# Patient Record
Sex: FEMALE | Race: OTHER | HISPANIC OR LATINO | Employment: OTHER | URBAN - METROPOLITAN AREA
[De-identification: names, ages, dates, MRNs, and addresses within clinical notes are randomized per-mention and may not be internally consistent; named-entity substitution may affect disease eponyms.]

---

## 2017-01-18 ENCOUNTER — GENERIC CONVERSION - ENCOUNTER (OUTPATIENT)
Dept: OTHER | Facility: OTHER | Age: 67
End: 2017-01-18

## 2017-03-08 ENCOUNTER — GENERIC CONVERSION - ENCOUNTER (OUTPATIENT)
Dept: OTHER | Facility: OTHER | Age: 67
End: 2017-03-08

## 2017-05-19 ENCOUNTER — GENERIC CONVERSION - ENCOUNTER (OUTPATIENT)
Dept: OTHER | Facility: OTHER | Age: 67
End: 2017-05-19

## 2017-06-19 ENCOUNTER — GENERIC CONVERSION - ENCOUNTER (OUTPATIENT)
Dept: OTHER | Facility: OTHER | Age: 67
End: 2017-06-19

## 2018-01-12 NOTE — PROGRESS NOTES
Edited Patient Health Assessment    Date:            03/08/2017  Blood Pressure:  136/80  Pulse:           70  Age:             74  Weight:          138 lbs  Height/Length:   5' 1"  Body Mass Index: 26 1  Provider:        [email or web address]  Clinic:          [facility] exam and adult prophy  consulted Dr Rohit Rizo re: previous notes re: premed for a catherization of  heart that was done years ago     per our conversation and previous dr note on 1/18/17, no premed indicated  Medical Alert: Anemia    Artificial Joints    Asthma    Glaucoma    Heart Condition    Rheumatic Fever    Frequent Headaches    Low Blood Pressure    Depression/Anxiety    cholesterol  Medications: Unknow ( Patient to bring Medication list)    Lithium    OLANZAPINE    Meloxicam    ASPIRin EC 81 MG TABLET 81 MG    Famotidine    Fluticasone Propionate    METOPROLOL   TAB 25MG ER 25    VIT D2 1 25 MG (50,000 UNIT)    OTHER  Allergies:      none  Since Last Visit: Medical Alert: No Change    Medications: No Change    Allergies:        No Change  Pain Scale Type: Numeric Pain ScalePain Level: 0  Description: pt reports some pain on UR occasionally  tried cavitron/ unable to use due to sensitivity  mainly polished off heavy  plaque accumulations today  cleaned denture in ultrasonic also  poor oral hygiene   heavy debris present generalized  Dr Rohit Rizo exam= numerous areas of decay      ----- Signed on Wednesday, March 08, 2017 at 2:29:50 PM  -----  ----- Provider: [id number] - Maral Grey RD -- Clinic: [address] [place] [address] -----

## 2018-01-13 NOTE — PROGRESS NOTES
Pt presents to schedule apt  I have not used a pre med for my care in the  past   So, I think there is no reason for the pre med now    nv  prophy and re  evaluate the tx plan     ----- Signed on Wednesday, January 18, 2017 at 1:31:39 PM  -----  ----- Provider: [id number] Britton DMD -- Clinic: [place] -----

## 2018-01-17 NOTE — PROGRESS NOTES
Patient Health Assessment    Date:            05/19/2017  Blood Pressure:  125/69  Pulse:           68  Age:             67  Weight:          0 lbs  Height/Length:   0' 0"  Body Mass Index: 0 0  Provider:        QUINTON  Clinic:          [facility]        Medical Alert: Anemia    Artificial Joints    Asthma    Glaucoma    Heart Condition    Rheumatic Fever    Frequent Headaches    Low Blood Pressure    Depression/Anxiety    cholesterol  Medications: Unknow ( Patient to bring Medication list)    Lithium    OLANZAPINE    Meloxicam    ASPIR EC 81 MG TABLET 81 MG    Famotidine    Fluticasone Propionate    METOPROLOL   TAB 25MG ER 25    VIT D2 1 25 MG (50,000 UNIT)    OTHER  Allergies:  Since Last Visit: Medical Alert: No Change    Medications: No Change    Allergies:        No Change  Pain Scale Type: Numeric Pain ScalePain Level: 0  Description: 74 yrs old pt presented for restoration  Decided to only worrk  on the right side today  Administered 1 carpule of 4% Septocaine with 1:100k  epi as infiltration around teeth #25 and 26  Prepared tooth #25 D and #26 M  Etched, bonded and restored with beautifil-flowable and bulk alpha II  composite  Finished and polished both restorations  checked the contacts    n v resin #23 and 24  a z/dr Smith Moura    ----- Signed on Friday, May 19, 2017 at 12:21:17 PM  -----  ----- Provider: [email or web address] - Resident Three, Dentist -- Clinic: [facility]  -----

## 2018-01-18 NOTE — PROGRESS NOTES
Patient Health Assessment    Date:            06/19/2017  Blood Pressure:  127/72  Pulse:           66  Age:             67  Weight:          138 lbs  Height/Length:   5' 1"  Body Mass Index: 26 1  Provider:        [email or web address]  Clinic:          [facility]        Medical Alert: Anemia    Artificial Joints    Asthma    Glaucoma    Heart Condition    Rheumatic Fever    Frequent Headaches    Low Blood Pressure    Depression/Anxiety    cholesterol  Medications: Unknow ( Patient to bring Medication list)    Lithium    OLANZAPINE    Meloxicam    ASPIR EC 81 MG TABLET 81 MG    Famotidine    Fluticasone Propionate    METOPROLOL   TAB 25MG ER 25    VIT D2 1 25 MG (50,000 UNIT)    OTHER  Allergies:  Since Last Visit: Medical Alert: No Change    Medications: No Change    Allergies:        No Change  Pain Scale Type: Numeric Pain ScalePain Level: 0  Description:      NOTE::: History of hip replacement  When asked, Boyfriend disclosed that pt  used the take antibiotics in the past before her dental visits and in the  past was told by physician that she needs antibiotics before going to the  dentist  Pt had run out of the antibiotics long time ago they said  Gave Dental   clearance form to take to their doctor and explained that we would need the  clearance before doing any work  Pt agreed  S: Pt presented with boyfriend with pain on and off from #12  Pt pointing at  #12  Pt said pain only when chewing and biting down  No spontaneous pain,  doesn't wake pt up at night  Pain only when chewing  Boyfriend helped in  translation  O: No swelling present  No signs of infection  Took PA and no clear pathology  noted on xray  Tooth has and existing root canal and build up  Clinically no  pathology noted either  Heavy occlusion on #12  #12 percussion (+)  #12 palpation (-)    A: Differential diag:  - Occlusal trauma  -Possible root fracture    P: Reduced occlusion of #12 and took tooth off the occlusion   Explained that if   tooth is still hurting after a couple of days that a fracture might exist  and tooth will need to be further evaluated for extraction  Pt and her  boyfriend understood and agreed  Pt left with her boyfriend in good health  Advised to call us if pain gets worse or if it doesn't go away  NV: resins (check on clearance)      SINAN Jones    ----- Signed on Monday, June 19, 2017 at 12:26:26 PM  -----  ----- Provider: Fabian Huerta Dentist -- Clinic: [address] [place] [address] -----

## 2020-09-04 ENCOUNTER — TELEMEDICINE (OUTPATIENT)
Dept: FAMILY MEDICINE CLINIC | Facility: CLINIC | Age: 70
End: 2020-09-04
Payer: COMMERCIAL

## 2020-09-04 DIAGNOSIS — M06.9 RHEUMATOID ARTHRITIS INVOLVING BOTH HANDS, UNSPECIFIED RHEUMATOID FACTOR PRESENCE: ICD-10-CM

## 2020-09-04 DIAGNOSIS — F99 PSYCHIATRIC DISORDER: Primary | ICD-10-CM

## 2020-09-04 DIAGNOSIS — I10 HYPERTENSION, UNSPECIFIED TYPE: ICD-10-CM

## 2020-09-04 DIAGNOSIS — R32 URINARY INCONTINENCE, UNSPECIFIED TYPE: ICD-10-CM

## 2020-09-04 DIAGNOSIS — R41.89 COGNITIVE DECLINE: ICD-10-CM

## 2020-09-04 PROCEDURE — 3288F FALL RISK ASSESSMENT DOCD: CPT | Performed by: FAMILY MEDICINE

## 2020-09-04 PROCEDURE — 1101F PT FALLS ASSESS-DOCD LE1/YR: CPT | Performed by: FAMILY MEDICINE

## 2020-09-04 PROCEDURE — 3725F SCREEN DEPRESSION PERFORMED: CPT | Performed by: FAMILY MEDICINE

## 2020-09-04 PROCEDURE — 99203 OFFICE O/P NEW LOW 30 MIN: CPT | Performed by: FAMILY MEDICINE

## 2020-09-04 NOTE — PROGRESS NOTES
Virtual Regular Visit      Assessment/Plan:    Problem List Items Addressed This Visit        Cardiovascular and Mediastinum    Hypertension     Per son, patient previously on 5 medications 1 which were metoprolol (unsure of other 4 medications)  Unknown whether or not blood pressure is within target but I suspect it is not as she has not been on her medications  Will ask how long she has been off of her medications  Will follow-up blood pressure measurement at 1 week follow-up, will assess need for antihypertensive at that time  Musculoskeletal and Integument    Rheumatoid arthritis ([address] )     Extent not determined but visibly deformed hands and fingers bilaterally, Ulnar deviation of fingers  Previously on prednisone which was discontinued by son for concerns of agitation/overactivity and increasing risk of hurting herself  Will consider referral to Rheumatology at follow-up visit in 1 week to assess patient in person  At this 1 week follow-up well determine extent of rheumatoid arthritis on examination  Other    Psychiatric disorder - Primary     Son reports history of bipolar disorder and schizophrenia  Previously on lithium for bipolar disorder and olanzapine for schizophrenia which were both discontinued by son for concerns of urinary incontinence which has not improved since cessation of medications  Follow-up 1 week in order to assess patient in clinic  Cognitive decline     Per son: progressive cognitive decline  diagnosed with acute dementia in 2017  Reports not oriented and needs frequent reorientation  Thanks of her children as they were young  Considering complex history will refer to Geriatrics  Discuss this with son and will revisit at follow-up in1 week  Urinary incontinence     Not on any medications per son  Uses incontinence supplies which he gets for her from a company named [employer] [place]  Son would like us to obtain supplies for patient from this company  He will bring company information at follow-up in 1 week  Reason for visit is No chief complaint on file  Encounter provider Reza Fox MD    Provider located at [address] [place] [address]  [phone]      Recent Visits  Date Type Provider Dept   20 Telemedicine Chema Hidalgo MD [facility] recent visits within past 7 days and meeting all other requirements     Future Appointments  No visits were found meeting these conditions  Showing future appointments within next 150 days and meeting all other requirements        The patient was identified by name and date of birth  Darin Ramos was informed that this is a telemedicine visit and that the visit is being conducted through [facility] and patient was informed that this is a secure, HIPAA-compliant platform  She agrees to proceed     My office door was closed  No one else was in the room  She acknowledged consent and understanding of privacy and security of the video platform  The patient has agreed to participate and understands they can discontinue the visit at any time  Patient is aware this is a billable service  Subjective  Darin Ramos is a 78 y o  female to establish care and discuss decline in cognitive function and urinary incontinence  History as per son who is sitting beside the patient, patient was intermittently visible throughout the encounter  Was been cared for by care taker who was friend of family but son feels like "things were being overlooked and I stepped in " He started caring for her in  after her mom , before that she lived with her mom in [place] due to her "mental illness as she "tried to kill herself " After this she lived with other son   In  "she found someone who was like a family friend who could take care of her " This per son cared for her until 2019 when son saw that she was being neglected evidence by a large sacral pressure ulcer  Ultimately the pts son got POA from  and was given a waiver  Son states that "the state started to send doctors and nurses to the house to take care of her " He also states that "she is really sick and has a lot of health conditions and her condition has been declining "    Psych: Bipolar previously treated with lithium, schizophrenia rx'd with olanzapine in past  Treated for bipolar disorder since 2000  Son felt that pts antipsychotics were causing her urinary incontinence as he read that and began to wean her off lithium and olanzapine but states that she continues to have dealing urinary incontinence  She is currently not on any psychiatric medications per son  Neuro:   · Seen at [facility] and diagnosed with "acute dementia," in 2017  · Confused, has not be oriented to place or time  Thinks of her children as "youngsters "  · Was put into "3-4 facilities but she got herself out " He thinks they were nursing homes  The previous caretaker put her in each facility  Son took her out of the facility and moved her into his home as he was concerned that they were not taking good care of her as she had told him "people come into my room and steal things "     [facility]:   · HTN: was on metoprolol, "and 3 other BP medications " Son took her off all medications because "she was on 5 heart meds and I think they were just making sxs up  I got her off all of her BP meds " Currently not on any BP meds  Unsure of her recent Blood pressures  MSK:   · H/o RA, severe  Is wheelchair bound  Was on prednisone "but was hurting herself more because she would over exert herself because she couldn't feel pain so she would do too much and was overactive " Son gives her ibuprofen when she complains of pain, and son states ibuprofen is the ONLY medication pt is currently on as he had weaned her off of all of her other medications  :   · Urinary incontinence  Currently getting supplies from "[employer]" and is wondering if we can order it from them if he gives us their number  Has "home aid from the waiver program that cares for her during the day " Son cares for pt at night  Would like TIFFANIE  Has difficulty with transportation as her family doesn't have a vehicle that is wheel chair compatible but also is a lot of work to get her into the car  Needs assistance with all ADLs and iADLs  Past Medical History:   Diagnosis Date    Anxiety     Depression     High cholesterol     Hypertension     Psychiatric disorder     Rheumatoid [address] [address] )        Past Surgical History:   Procedure Laterality Date    CHOLECYSTECTOMY      COLONOSCOPY      EYE SURGERY Bilateral 2012    HIP SURGERY Left 2010    HYSTERECTOMY      LINUS and ?BSO    MAMMO (HISTORICAL)  03/2017   - hip surgery in 2009    No current outpatient medications on file  No current facility-administered medications for this visit  No Known Allergies    Review of Systems   Constitutional:        Wheelchair-bound   Respiratory: Negative for cough  Genitourinary:        Urinary incontinence   Musculoskeletal: Positive for arthralgias  Neurological: Positive for weakness  Confusion  Memory loss  Poor short-term memory, requires frequent orientation as to date, place  Also needs reminder that kids are adults and not children  Psychiatric/Behavioral: Positive for confusion  Video Exam    There were no vitals filed for this visit  Physical Exam  Constitutional:       General: She is not in acute distress  Appearance: She is not diaphoretic  Comments: Patient lying in bed, son seated bedside  Patient smiling when phone turned towards her  Pulmonary:      Effort: Pulmonary effort is normal    Musculoskeletal:         General: Deformity (Gross deformity of bilateral MCP joints, ulnar deviation noted bilaterally ) present            I spent 10 minutes directly with the patient during this visit, but as patient has cognitive dysfunction majority of time spent with sun while patient was beside him  Total time spent during this encounter is 50 minutes  VIRTUAL VISIT DISCLAIMER    Darin Ramos acknowledges that she has consented to an online visit or consultation  She understands that the online visit is based solely on information provided by her, and that, in the absence of a face-to-face physical evaluation by the physician, the diagnosis she receives is both limited and provisional in terms of accuracy and completeness  This is not intended to replace a full medical face-to-face evaluation by the physician  Darin Ramos understands and accepts these terms

## 2020-09-05 PROBLEM — R32 URINARY INCONTINENCE: Status: ACTIVE | Noted: 2020-09-05

## 2020-09-05 PROBLEM — R41.89 COGNITIVE DECLINE: Status: ACTIVE | Noted: 2020-09-05

## 2020-09-05 PROBLEM — E78.49 OTHER HYPERLIPIDEMIA: Status: ACTIVE | Noted: 2020-09-05

## 2020-09-05 PROBLEM — I25.119 CORONARY ARTERY DISEASE INVOLVING NATIVE CORONARY ARTERY OF NATIVE HEART WITH ANGINA PECTORIS (HCC): Status: ACTIVE | Noted: 2020-09-05

## 2020-09-06 NOTE — ASSESSMENT & PLAN NOTE
Not on any medications per son  Uses incontinence supplies which he gets for her from a company named [employer]  Son would like us to obtain supplies for patient from this company  He will bring company information at follow-up in 1 week

## 2020-09-06 NOTE — ASSESSMENT & PLAN NOTE
Per son, patient previously on 5 medications 1 which were metoprolol (unsure of other 4 medications)  Unknown whether or not blood pressure is within target but I suspect it is not as she has not been on her medications  Will ask how long she has been off of her medications  Will follow-up blood pressure measurement at 1 week follow-up, will assess need for antihypertensive at that time

## 2020-09-06 NOTE — ASSESSMENT & PLAN NOTE
Per son: progressive cognitive decline  diagnosed with acute dementia in 2017  Reports not oriented and needs frequent reorientation  Thanks of her children as they were young  Considering complex history will refer to Geriatrics  Discuss this with son and will revisit at follow-up in1 week

## 2020-09-06 NOTE — ASSESSMENT & PLAN NOTE
Son reports history of bipolar disorder and schizophrenia  Previously on lithium for bipolar disorder and olanzapine for schizophrenia which were both discontinued by son for concerns of urinary incontinence which has not improved since cessation of medications  Follow-up 1 week in order to assess patient in clinic

## 2020-09-06 NOTE — ASSESSMENT & PLAN NOTE
Noted on care everywhere:  (2017)   · Total cholesterol 215  ·   · Triglycerides 183    Again, not on any medications per son

## 2020-09-06 NOTE — ASSESSMENT & PLAN NOTE
Extent not determined but visibly deformed hands and fingers bilaterally, Ulnar deviation of fingers  Previously on prednisone which was discontinued by son for concerns of agitation/overactivity and increasing risk of hurting herself  Will consider referral to Rheumatology at follow-up visit in 1 week to assess patient in person  At this 1 week follow-up well determine extent of rheumatoid arthritis on examination

## 2020-09-11 ENCOUNTER — OFFICE VISIT (OUTPATIENT)
Dept: FAMILY MEDICINE CLINIC | Facility: CLINIC | Age: 70
End: 2020-09-11
Payer: COMMERCIAL

## 2020-09-11 VITALS
TEMPERATURE: 96.2 F | OXYGEN SATURATION: 99 % | DIASTOLIC BLOOD PRESSURE: 60 MMHG | HEART RATE: 100 BPM | RESPIRATION RATE: 20 BRPM | WEIGHT: 70 LBS | SYSTOLIC BLOOD PRESSURE: 86 MMHG

## 2020-09-11 DIAGNOSIS — I25.119 CORONARY ARTERY DISEASE INVOLVING NATIVE CORONARY ARTERY OF NATIVE HEART WITH ANGINA PECTORIS (HCC): ICD-10-CM

## 2020-09-11 DIAGNOSIS — R41.3 MEMORY DISTURBANCE: ICD-10-CM

## 2020-09-11 DIAGNOSIS — R32 URINARY INCONTINENCE, UNSPECIFIED TYPE: ICD-10-CM

## 2020-09-11 DIAGNOSIS — E78.49 OTHER HYPERLIPIDEMIA: ICD-10-CM

## 2020-09-11 DIAGNOSIS — M06.9 RHEUMATOID ARTHRITIS INVOLVING BOTH HANDS, UNSPECIFIED RHEUMATOID FACTOR PRESENCE: ICD-10-CM

## 2020-09-11 DIAGNOSIS — R41.89 COGNITIVE DECLINE: Primary | ICD-10-CM

## 2020-09-11 DIAGNOSIS — R64 CACHECTIC (HCC): ICD-10-CM

## 2020-09-11 DIAGNOSIS — I25.10 CORONARY ARTERY DISEASE INVOLVING NATIVE CORONARY ARTERY OF NATIVE HEART, ANGINA PRESENCE UNSPECIFIED: ICD-10-CM

## 2020-09-11 DIAGNOSIS — F99 PSYCHIATRIC DISORDER: ICD-10-CM

## 2020-09-11 DIAGNOSIS — Z13.1 SCREENING FOR DIABETES MELLITUS: ICD-10-CM

## 2020-09-11 DIAGNOSIS — I95.89 OTHER SPECIFIED HYPOTENSION: ICD-10-CM

## 2020-09-11 DIAGNOSIS — Z78.9 NEED FOR FOLLOW-UP BY SOCIAL WORKER: ICD-10-CM

## 2020-09-11 DIAGNOSIS — R53.83 FATIGUE, UNSPECIFIED TYPE: ICD-10-CM

## 2020-09-11 DIAGNOSIS — R68.89 LOW BODY TEMPERATURE: ICD-10-CM

## 2020-09-11 PROCEDURE — 99214 OFFICE O/P EST MOD 30 MIN: CPT | Performed by: FAMILY MEDICINE

## 2020-09-11 NOTE — PROGRESS NOTES
Family Medicine Follow-Up Office Visit  Kash Smallwood 78 y o  female   MRN: [id number] : 1950  ENCOUNTER: 2020 10:38 AM    Assessment & Plan   Low blood pressure  Likely 2/2 severely malnourished and minimal po intake  Has had chronic intermittent dizziness but no change as of now  Son unsure of recent BP as she used to be on anti-HTN meds  Plan: encouraged po intake of food and liquids  ED precautions emphasized, caretaker verbalizes understanding and is agreeable  F/u 2 weeks, reassess  Low body temperature  Likely 2/2 minimal adiposity  Visibly appears cold, reports she is always cold  Plan: CBC ordered, f/u wbc count  Encouraged po intake in attempt to increase adiposity but will likely need nutrition supplementation, may be best to get nutritionist involved but considering multiple chronic illnesses that are poorly controlled due to non-adherence, will first have them f/u with geriatrics and psych to assess if underlying etiology is psychiatric vs dementia and to optimize adherence to plan will avoid overwhelming with too many f/u all at once  F/u in 2 weeks, reassess temp and ensure they've scheduled f/u with specialists  Other hyperlipidemia  H/o hyperlipidemia and CAD s/p stent  Unknown if on statin but considering her cardiac hx she should be  Plan: ordered lipid panel, will discuss at 2 week f/u and assess ASCVD risk and weight benefit to risk considering poor status 2/2 multiple poorly controlled chronic diseases and what goals of care are  F/u in 2 weeks, discuss labs, ASCVD risk score and goals of care  Urinary incontinence  Unspecified type  Has incontinence supplies currently to last approx 1 month per son  Would like us to work with the company they are already getting incontinence supplies from  Plan: will ask for company info at f/u visit and have staff reach out to company and get this process started       Cognitive decline  2/2 dementia vs psychosis in setting of uncontrolled psychiatric conditions (bipolar 1 disorder and schizophrenia), may by ?schizoaffective disorder considering sxs  Plan: referral to geriatrics for mindstream testing, referral to psychiatry for assessment of psychiatric conditions educated pt that sxs that are consistent with dementia can overlap with psychosis and discussed importance of psych and geriatrics f/u  Caregiver verbalizes understanding and agrees with the plan  Psychiatric disorder  H/o bipolar 1 disorder and schizophrenia  Schizoaffective disorder is also on my differential  Was tearful with sad affect during this encounter, son reports this is how she is at home  Not taking meds, was previously on olanzapine + lithium but stopped by son for concerns that it was making her agitated, she would scream  Plan: referral to psychiatry for assessment of psychiatric conditions, f/u in 2 weeks to ensure they've scheduled appointment  Need for follow-up by   There was mention of difficulty with transportation as pt is wheelchair bound  Also, questionable adherence to treatment by caregiver, may benefit from [facility] assessment  Plan: social work referral sent, appreciated input  Rheumatoid arthritis ([address] )  Was previously on prednisone (per son) and leflunamide (per chart review - care everywhere)  Both were stopped by son because of concerns that the decreased pain makes increases activity which leads to injuries  Severe deformity of hands and feet bilaterally  Plan: will discuss referral to rheumatology at f/u, did not do it today as want to avoid overwhelming caregiver in attempt to optimize adherence to plan  Also, need guidance by social work for transport, social work referral ordered  F/u in 2 weeks  Coronary artery disease involving native coronary artery of native heart with angina pectoris [facility])  S/p angioplasty with stent (2002)  Care everywhere (8/2017) reports h/o CAD, hyperlipidemia, and hypertension  Son reports was on metoprolol but he "weaned her off," though metoprolol not mentioned in last note by cardiology [facility]) in 8/2017  Today has low BP  Plan: ordered lipid panel, f/u bp at next visit  I suspect low pressures due to cachexia with poor po intake   ED precautions emphasized  Caregiver verbalizes understanding and agrees with the plan  All orders for this visit as below:  Orders Placed This Encounter   Procedures    CBC and Platelet    Comprehensive metabolic panel    TSH, 3rd generation    Vitamin B12    Lipid panel    Folate    Ambulatory referral to Psychiatry    Ambulatory referral to Geriatrics    Ambulatory referral to social work care management program     Discussed case with Dr Dereje Green examined pt as well  Subjective     Chief Complaint   F/u for cognitive decline, urinary incontinence, psych history, HTN  History of Present Illness   Darshana Galaviz is a 78y o -year-old female who presents today for f/u of virtual visit to further discuss Hazel's medical conditions  Son is POA/caregiver and is present  HPI from pt, son, and home health aid       Neuro: cognitive decline and memory loss  Needs frequent reorientation for time and place per son but today pt able to verbalize that  Son believes she has "advanced stage alzheimer's" based upon his research online       Psych: h/o bipolar 1 disorder and schizophrenia, not on medications as son weaned her off of them because he was concerned that they were causing her urinary incontinence  Reports that pt has decreased appetite, weight loss, and at times becomes tearful  Also appears "tired all the time " Son admits that pt becomes tearful often       [facility]: h/o HTN was on "5 medications" but son felt that she did not need them and that the doctors/nurses provided by the state were "making up symptoms to give her more medications " Also has h/o CAD s/p stent noted on chart review (care everywhere)  H/o hyperlipidemia     [facility]: severe RA, wheelchair bound  Again, meds (prednisone, leflunamide) stopped by caregiver as "she gets agitated and starts screaming  It stops her from feeling pain and so she gets active and hurts her self "      : Urinary incontinence  Gets supplies from a company called [employer]  Would like us to work with them  Past Medical History:   Diagnosis Date    Anxiety     Depression     High cholesterol     Hypertension     Psychiatric disorder     Rheumatoid arthritis ([address] )        Prior Medications for active conditions but not being taken: (per care everywhere - last visit with cardiology 8/14/2017)  · Famotidine 40 mg qd  · flovent 50mcg 1 puff bid   · leflunomide 20mg qd   · Lithium 300mg tid   · Losartan 25mg qd  · meloxicam 7 5mg qd   · Olanzapine (zyprexa) 10mg qhs  FamHx/SocHx  family history includes Colon cancer in her father; Glaucoma in her mother; Heart attack in her mother; Hypertension in her father and mother; Thyroid disease in her father    reports that she has quit smoking  She quit after 30 00 years of use  She does not have any smokeless tobacco history on file  She reports current alcohol use  (1-2 glasses of wine per night)      Review of Systems   Review of Systems   Constitutional: Positive for activity change (wheelchair bound due to severe RA), appetite change and fatigue  Respiratory: Negative for shortness of breath  Cardiovascular: Negative for chest pain and leg swelling  Gastrointestinal: Negative for abdominal pain, blood in stool and diarrhea  Genitourinary:        Urinary incontinence    Musculoskeletal: Positive for arthralgias (B/L feet and hands  deformities of hands and feet 2/2 RA)  Wheelchair bound due to severe RA   Skin: Positive for pallor  Neurological: Positive for dizziness (intermittent)     Psychiatric/Behavioral:        Appears "down", son believed due to "severe dementia "        Active Problem List     Patient Active Problem List   Diagnosis    Psychiatric disorder    [facility]    Low blood pressure    Cognitive decline    Urinary incontinence    Coronary artery disease involving native coronary artery of native heart with angina pectoris ([facility] )    Other hyperlipidemia    Low body temperature    Need for follow-up by        Past Medical History, Past Surgical History, Family History, and Social History were reviewed and updated today as appropriate  Objective   BP (!) 86/60 (BP Location: Right arm, Patient Position: Sitting, Cuff Size: Child)   Pulse 100   Temp (!) 96 2 °F (35 7 °C)   Resp 20   Wt 31 8 kg (70 lb)   SpO2 99%     Physical Exam  Constitutional:       General: She is not in acute distress  Appearance: She is ill-appearing  She is not toxic-appearing  Comments: Severely cachectic   HENT:      Head:      Comments: Temporal wasting     Mouth/Throat:      Comments: Edentulous   Eyes:      General: No scleral icterus  Right eye: No discharge  Left eye: No discharge  Pupils: Pupils are equal, round, and reactive to light  Cardiovascular:      Rate and Rhythm: Normal rate and regular rhythm  Pulses: Normal pulses  Heart sounds: Normal heart sounds  No murmur  Pulmonary:      Effort: Pulmonary effort is normal       Breath sounds: Normal breath sounds  No wheezing or rales  Abdominal:      General: Abdomen is flat  Bowel sounds are normal  There is no distension  Palpations: Abdomen is soft  Tenderness: There is no abdominal tenderness  There is no guarding  Musculoskeletal:         General: Deformity (of hands and feet  ) present  Right lower leg: No edema  Left lower leg: No edema  Comments: Prominent clavicles, ribs  Overall, has minimal adiposity  Skin:     Capillary Refill: Capillary refill takes 2 to 3 seconds  Coloration: Skin is pale  Skin is not jaundiced  Findings: No bruising     Neurological: Comments: Oriented to person and place but not to time/season   Psychiatric:      Comments: Sad affect, was tearful during encounter  Pertinent Laboratory/Diagnostic Studies:  No labs at this time  Orders Placed This Encounter   Procedures    CBC and Platelet    Comprehensive metabolic panel    TSH, 3rd generation    Vitamin B12    Lipid panel    Folate    Ambulatory referral to Psychiatry    Ambulatory referral to Geriatrics    Ambulatory referral to social work care management program       Current Medications   Ibuprofen prn (per son) - previous meds as above (see HPI)    ALLERGIES:  No Known Allergies    Health Maintenance     Health Maintenance   Topic Date Due    Hepatitis C Screening  1950    Medicare Annual Wellness Visit (AWV)  1950    MAMMOGRAM  1950    BMI: Adult  02/04/1970    DTaP,Tdap,and Td Vaccines (1 - Tdap) 02/04/1973    Colorectal Cancer Screening  02/04/2002    Pneumococcal Vaccine: 65+ Years (1 of 1 - PPSV23) 02/04/2017    Influenza Vaccine  07/01/2020    Fall Risk  09/03/2021    Depression Screening PHQ  09/04/2021    HIB Vaccine  Aged Out    Hepatitis B Vaccine  Aged Out    IPV Vaccine  Aged Out    Hepatitis A Vaccine  Aged Out    Meningococcal ACWY Vaccine  Aged Out    HPV Vaccine  Aged Out       There is no immunization history on file for this patient  Roshan Morgan MD   [address]  9/13/2020  10:38 AM    Parts of this note were dictated using Clean[employer] dictation software and may have sounds-like errors due to variation in pronunciation

## 2020-09-13 PROBLEM — R68.89 LOW BODY TEMPERATURE: Status: ACTIVE | Noted: 2020-09-13

## 2020-09-13 PROBLEM — Z78.9 NEED FOR FOLLOW-UP BY SOCIAL WORKER: Status: ACTIVE | Noted: 2020-09-13

## 2020-09-13 NOTE — ASSESSMENT & PLAN NOTE
H/o hyperlipidemia and CAD s/p stent  Unknown if on statin but considering her cardiac hx she should be  Plan: ordered lipid panel, will discuss at 2 week f/u and assess ASCVD risk and weight benefit to risk considering poor status 2/2 multiple poorly controlled chronic diseases and what goals of care are  F/u in 2 weeks, discuss labs, ASCVD risk score and goals of care

## 2020-09-13 NOTE — ASSESSMENT & PLAN NOTE
Likely 2/2 severely malnourished and minimal po intake  Has had chronic intermittent dizziness but no change as of now  Son unsure of recent BP as she used to be on anti-HTN meds  Plan: encouraged po intake of food and liquids  ED precautions emphasized, caretaker verbalizes understanding and is agreeable  F/u 2 weeks, reassess

## 2020-09-13 NOTE — ASSESSMENT & PLAN NOTE
H/o bipolar 1 disorder and schizophrenia  Schizoaffective disorder is also on my differential  Was tearful with sad affect during this encounter, son reports this is how she is at home  Not taking meds, was previously on olanzapine + lithium but stopped by son for concerns that it was making her agitated, she would scream  Plan: referral to psychiatry for assessment of psychiatric conditions, f/u in 2 weeks to ensure they've scheduled appointment

## 2020-09-13 NOTE — ASSESSMENT & PLAN NOTE
Likely 2/2 minimal adiposity  Visibly appears cold, reports she is always cold  Plan: CBC ordered, f/u wbc count  Encouraged po intake in attempt to increase adiposity but will likely need nutrition supplementation, may be best to get nutritionist involved but considering multiple chronic illnesses that are poorly controlled due to non-adherence, will first have them f/u with geriatrics and psych to assess if underlying etiology is psychiatric vs dementia and to optimize adherence to plan will avoid overwhelming with too many f/u all at once  F/u in 2 weeks, reassess temp and ensure they've scheduled f/u with specialists

## 2020-09-13 NOTE — ASSESSMENT & PLAN NOTE
S/p angioplasty with stent (2002)  Care everywhere (8/2017) reports h/o CAD, hyperlipidemia, and hypertension  Son reports was on metoprolol but he "weaned her off," though metoprolol not mentioned in last note by cardiology [facility]) in 8/2017  Today has low BP  Plan: ordered lipid panel, f/u bp at next visit  I suspect low pressures due to cachexia with poor po intake   ED precautions emphasized  Caregiver verbalizes understanding and agrees with the plan

## 2020-09-13 NOTE — ASSESSMENT & PLAN NOTE
Unspecified type  Has incontinence supplies currently to last approx 1 month per son  Would like us to work with the company they are already getting incontinence supplies from  Plan: will ask for company info at f/u visit and have staff reach out to company and get this process started

## 2020-09-13 NOTE — ASSESSMENT & PLAN NOTE
There was mention of difficulty with transportation as pt is wheelchair bound  Also, questionable adherence to treatment by caregiver, may benefit from [facility] On Aging assessment  Plan: social work referral sent, appreciated input

## 2020-09-14 ENCOUNTER — TELEPHONE (OUTPATIENT)
Dept: GERIATRICS | Age: 70
End: 2020-09-14

## 2020-09-14 NOTE — TELEPHONE ENCOUNTER
[address]  [phone]  Telephone Intake Geriatric Assessment     Referral Source: Fuentes Mace MD                             Patients PCP: Dr Angelika Webb): son      Cindi Larkiney): son                      Phone Number: [phone]              Is caller POA? Yes      Reason for referral: Memory issues      Others residing with patient: N/A     Has the patient ever been formally tested for memory/dementia? unknown     Has the patient ever been diagnosed with dementia? unknown     Has the patient been seen by a Neurologist?  unknown     What is the goal of visit? Needs guidances regarding care for mom     Preferred language? English    Highest education level? unknown    Can patient read English? can't read or comprehend    Does the patient wear glasses? unknown    Does the patient use hearing aids? unknown     First Visit: TBD    Conference Visit: TBD  Patient's son stated that patient's hands is extremely contracted, patient is not aware of her surroundings, and she is incontinent of urine and bowel  In addition he has to carry patient to and from bathroom  Patient's son feels like he is at a lose and does not know what to do       Office packet mailed out: No    In office visit and family conference video visit process explained: No

## 2020-09-25 ENCOUNTER — OFFICE VISIT (OUTPATIENT)
Dept: FAMILY MEDICINE CLINIC | Facility: CLINIC | Age: 70
End: 2020-09-25
Payer: COMMERCIAL

## 2020-09-25 ENCOUNTER — PATIENT OUTREACH (OUTPATIENT)
Dept: CASE MANAGEMENT | Facility: OTHER | Age: 70
End: 2020-09-25

## 2020-09-25 VITALS
OXYGEN SATURATION: 100 % | WEIGHT: 70 LBS | DIASTOLIC BLOOD PRESSURE: 70 MMHG | SYSTOLIC BLOOD PRESSURE: 100 MMHG | TEMPERATURE: 97.7 F | HEART RATE: 90 BPM

## 2020-09-25 DIAGNOSIS — R64 CACHECTIC (HCC): ICD-10-CM

## 2020-09-25 DIAGNOSIS — F99 PSYCHIATRIC DISORDER: Primary | ICD-10-CM

## 2020-09-25 DIAGNOSIS — M06.9 RHEUMATOID ARTHRITIS INVOLVING BOTH HANDS, UNSPECIFIED RHEUMATOID FACTOR PRESENCE: ICD-10-CM

## 2020-09-25 DIAGNOSIS — I95.89 OTHER SPECIFIED HYPOTENSION: ICD-10-CM

## 2020-09-25 DIAGNOSIS — Z78.9 NEED FOR FOLLOW-UP BY SOCIAL WORKER: ICD-10-CM

## 2020-09-25 DIAGNOSIS — R32 URINARY INCONTINENCE, UNSPECIFIED TYPE: ICD-10-CM

## 2020-09-25 DIAGNOSIS — Z78.9 NEED FOR FOLLOW-UP BY SOCIAL WORKER: Primary | ICD-10-CM

## 2020-09-25 DIAGNOSIS — E46 MALNUTRITION, UNSPECIFIED TYPE (HCC): ICD-10-CM

## 2020-09-25 DIAGNOSIS — R41.89 COGNITIVE DECLINE: ICD-10-CM

## 2020-09-25 PROCEDURE — 1036F TOBACCO NON-USER: CPT | Performed by: FAMILY MEDICINE

## 2020-09-25 PROCEDURE — 3074F SYST BP LT 130 MM HG: CPT | Performed by: FAMILY MEDICINE

## 2020-09-25 PROCEDURE — 99213 OFFICE O/P EST LOW 20 MIN: CPT | Performed by: FAMILY MEDICINE

## 2020-09-25 PROCEDURE — 1160F RVW MEDS BY RX/DR IN RCRD: CPT | Performed by: FAMILY MEDICINE

## 2020-09-25 PROCEDURE — 3078F DIAST BP <80 MM HG: CPT | Performed by: FAMILY MEDICINE

## 2020-09-25 RX ORDER — IBUPROFEN 400 MG/1
TABLET ORAL EVERY 6 HOURS PRN
COMMUNITY
End: 2022-06-22

## 2020-09-25 RX ORDER — LACTOSE-REDUCED FOOD
1 LIQUID (ML) ORAL 3 TIMES DAILY
Qty: 90 BOTTLE | Refills: 2 | Status: SHIPPED | OUTPATIENT
Start: 2020-09-25 | End: 2020-10-02

## 2020-09-25 RX ORDER — LACTOSE-REDUCED FOOD
1 LIQUID (ML) ORAL 3 TIMES DAILY
Qty: 90 BOTTLE | Refills: 2 | Status: SHIPPED | OUTPATIENT
Start: 2020-09-25 | End: 2020-09-25 | Stop reason: CLARIF

## 2020-09-25 NOTE — ASSESSMENT & PLAN NOTE
Not yet scheduled with psych  Pts son (POA/caregiver) reluctant to take her to other clinics in order to "avoid getting her sick " He also does not want her on meds  We discussed that our first step needs to be for her to have a visit with psych and then we can discuss meds if that is what psych recommends  Pts son finally agrees and states he deleon schedule appt with psych  Pt denies thoughts of harming self  F/u 2 weeks to ensure this appt scheduled

## 2020-09-25 NOTE — ASSESSMENT & PLAN NOTE
Handled and appt made via portal    Pts son reports he has not yet received a call from social work  He states he is not always able to get to his phone and that social work should leave msg and he will call them back  Plan: follow up with social work recommendations, consider area on aging

## 2020-09-25 NOTE — ASSESSMENT & PLAN NOTE
Per son  Due to urinary incontinence pts son believes she has severe dementia absed on online research  Geriatrics recommends first seeing psych as her psych conditions may be playing a role in cognitive/memory decline  Will have pt f/u with geriatrics once we have recommendations from psych

## 2020-09-25 NOTE — ASSESSMENT & PLAN NOTE
Needs supplies  Will have preferred company [employer] contact clinic and provide paperwork  Will complete paperwork when received and get pt her supplies

## 2020-09-25 NOTE — PROGRESS NOTES
OPCM,  is responding to consult from [facility] office  Patient has significant medical and mental health history  Patient is being cared for by her son and there is concern about his ability to care for her  Telephone call placed to patient's son and message left on his VM with my contact information and requested that he return my call  I will attempt to contact him again on Monday and provide needed assistance and support

## 2020-09-25 NOTE — ASSESSMENT & PLAN NOTE
Pressures soft but improved from last visit  Denies lightheadedness, dizziness, chest pain  Repeat at 2 week f/u

## 2020-09-25 NOTE — ASSESSMENT & PLAN NOTE
Hand deformity with ulnar deviation in hands B/L  Right knee limited ROM (unable to extend fully)  Pain mostly in knees  Son d/c all meds including prednisone over a year back  She now gets ibuprofen or tylenol for pain prn  Plan: will have her see rheumatology once she is established with psych then geriatrics  Son very reluctant to see multiple specialists so will have to tread carefully without overwhelming

## 2020-09-28 ENCOUNTER — TELEPHONE (OUTPATIENT)
Dept: FAMILY MEDICINE CLINIC | Facility: CLINIC | Age: 70
End: 2020-09-28

## 2020-09-28 ENCOUNTER — PATIENT OUTREACH (OUTPATIENT)
Dept: CASE MANAGEMENT | Facility: OTHER | Age: 70
End: 2020-09-28

## 2020-09-28 NOTE — TELEPHONE ENCOUNTER
Elaina Baer received prescription for Ensure  Pharmacist stated that they do not deal with nutritional supplements there  She recommended reaching out to [facility]

## 2020-09-28 NOTE — TELEPHONE ENCOUNTER
Certificate of medical necessity received from Na requesting signature  Form place in Dr Ram Poag folder in preceptor room  Please advise  Thank you
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
## 2020-09-29 DIAGNOSIS — R64 CACHECTIC (HCC): ICD-10-CM

## 2020-09-29 DIAGNOSIS — E46 MALNUTRITION, UNSPECIFIED TYPE (HCC): ICD-10-CM

## 2020-09-30 ENCOUNTER — PATIENT OUTREACH (OUTPATIENT)
Dept: CASE MANAGEMENT | Facility: OTHER | Age: 70
End: 2020-09-30

## 2020-09-30 ENCOUNTER — TELEPHONE (OUTPATIENT)
Dept: PSYCHIATRY | Facility: CLINIC | Age: 70
End: 2020-09-30

## 2020-09-30 NOTE — PROGRESS NOTES
Telephone call received from patient's son  He was requesting information regarding an appointment for patient with Psychiatry  I informed  him that he was called this morning from that dept and a message was left on his VM  I advised him to listen to his messages and return the call to set up an appointment for patient   I also provided him with the telephone number for out patient [phone]  He expressed his concerns about putting patient on psychiatric medications again  He does not believe that they were beneficial and thinks that his mother is doing much better without them  I advised him to discuss his concerns with the Psychiatrist and follow their recommendations  He also informed me that he was able to obtain copies of his mothers old psychiatric records and he provided them to Dr Helga Valenzuela office for review

## 2020-10-01 NOTE — TELEPHONE ENCOUNTER
Fax returned requesting signature from alternate prescribing physician [place] medicaid  Placed in Dr Jam Jones folder in preceptor room  Please advise  Thank you

## 2020-10-02 DIAGNOSIS — E46 MALNUTRITION, UNSPECIFIED TYPE (HCC): Primary | ICD-10-CM

## 2020-10-02 DIAGNOSIS — R64 CACHECTIC (HCC): ICD-10-CM

## 2020-10-02 RX ORDER — CALORIC SUPPLEMENT
1 LIQUID (ML) ORAL 3 TIMES DAILY
Qty: 84 BOTTLE | Refills: 6 | Status: CANCELLED | OUTPATIENT
Start: 2020-10-02

## 2020-10-02 RX ORDER — CALORIC SUPPLEMENT
LIQUID (ML) ORAL
Status: CANCELLED | OUTPATIENT
Start: 2020-10-02

## 2020-10-06 DIAGNOSIS — R64 CACHECTIC (HCC): ICD-10-CM

## 2020-10-06 DIAGNOSIS — E46 MALNUTRITION, UNSPECIFIED TYPE (HCC): Primary | ICD-10-CM

## 2020-10-06 RX ORDER — CALORIC SUPPLEMENT
1 LIQUID (ML) ORAL 3 TIMES DAILY
Qty: 90 BOTTLE | Refills: 2 | Status: SHIPPED | OUTPATIENT
Start: 2020-10-06 | End: 2022-03-14

## 2020-10-30 ENCOUNTER — PATIENT OUTREACH (OUTPATIENT)
Dept: FAMILY MEDICINE CLINIC | Facility: CLINIC | Age: 70
End: 2020-10-30

## 2020-11-23 ENCOUNTER — PATIENT OUTREACH (OUTPATIENT)
Dept: FAMILY MEDICINE CLINIC | Facility: CLINIC | Age: 70
End: 2020-11-23

## 2020-11-24 ENCOUNTER — TELEMEDICINE (OUTPATIENT)
Dept: PSYCHIATRY | Facility: CLINIC | Age: 70
End: 2020-11-24
Payer: COMMERCIAL

## 2020-11-24 DIAGNOSIS — F99 PSYCHIATRIC DISORDER: ICD-10-CM

## 2020-11-24 DIAGNOSIS — F03.90 MAJOR NEUROCOGNITIVE DISORDER (HCC): Primary | Chronic | ICD-10-CM

## 2020-11-24 PROCEDURE — 90792 PSYCH DIAG EVAL W/MED SRVCS: CPT | Performed by: STUDENT IN AN ORGANIZED HEALTH CARE EDUCATION/TRAINING PROGRAM

## 2020-12-01 ENCOUNTER — TELEPHONE (OUTPATIENT)
Dept: LAB | Facility: HOSPITAL | Age: 70
End: 2020-12-01

## 2020-12-01 ENCOUNTER — TELEMEDICINE (OUTPATIENT)
Dept: FAMILY MEDICINE CLINIC | Facility: CLINIC | Age: 70
End: 2020-12-01
Payer: COMMERCIAL

## 2020-12-01 DIAGNOSIS — Z78.9 NEED FOR FOLLOW-UP BY SOCIAL WORKER: ICD-10-CM

## 2020-12-01 DIAGNOSIS — F99 PSYCHIATRIC DISORDER: ICD-10-CM

## 2020-12-01 DIAGNOSIS — F03.90 MAJOR NEUROCOGNITIVE DISORDER (HCC): Primary | Chronic | ICD-10-CM

## 2020-12-01 PROCEDURE — 99213 OFFICE O/P EST LOW 20 MIN: CPT | Performed by: FAMILY MEDICINE

## 2020-12-15 ENCOUNTER — APPOINTMENT (OUTPATIENT)
Dept: LAB | Facility: HOSPITAL | Age: 70
End: 2020-12-15
Payer: COMMERCIAL

## 2020-12-15 DIAGNOSIS — R41.3 MEMORY DISTURBANCE: ICD-10-CM

## 2020-12-15 DIAGNOSIS — R64 CACHECTIC (HCC): ICD-10-CM

## 2020-12-15 DIAGNOSIS — Z13.1 SCREENING FOR DIABETES MELLITUS: ICD-10-CM

## 2020-12-15 DIAGNOSIS — R53.83 FATIGUE, UNSPECIFIED TYPE: ICD-10-CM

## 2020-12-15 DIAGNOSIS — I25.10 CORONARY ARTERY DISEASE INVOLVING NATIVE CORONARY ARTERY OF NATIVE HEART, ANGINA PRESENCE UNSPECIFIED: ICD-10-CM

## 2020-12-15 LAB
ALBUMIN SERPL BCP-MCNC: 3.8 G/DL (ref 3.5–5)
ALP SERPL-CCNC: 112 U/L (ref 46–116)
ALT SERPL W P-5'-P-CCNC: 23 U/L (ref 12–78)
ANION GAP SERPL CALCULATED.3IONS-SCNC: 8 MMOL/L (ref 4–13)
AST SERPL W P-5'-P-CCNC: 18 U/L (ref 5–45)
BILIRUB SERPL-MCNC: 0.36 MG/DL (ref 0.2–1)
BUN SERPL-MCNC: 69 MG/DL (ref 5–25)
CALCIUM SERPL-MCNC: 14.2 MG/DL (ref 8.3–10.1)
CHLORIDE SERPL-SCNC: 108 MMOL/L (ref 100–108)
CHOLEST SERPL-MCNC: 142 MG/DL (ref 50–200)
CO2 SERPL-SCNC: 25 MMOL/L (ref 21–32)
CREAT SERPL-MCNC: 2.54 MG/DL (ref 0.6–1.3)
ERYTHROCYTE [DISTWIDTH] IN BLOOD BY AUTOMATED COUNT: 14.1 % (ref 11.6–15.1)
FOLATE SERPL-MCNC: >20 NG/ML (ref 3.1–17.5)
GFR SERPL CREATININE-BSD FRML MDRD: 19 ML/MIN/1.73SQ M
GLUCOSE SERPL-MCNC: 92 MG/DL (ref 65–140)
HCT VFR BLD AUTO: 32.8 % (ref 34.8–46.1)
HDLC SERPL-MCNC: 61 MG/DL
HGB BLD-MCNC: 10 G/DL (ref 11.5–15.4)
LDLC SERPL CALC-MCNC: 68 MG/DL (ref 0–100)
MCH RBC QN AUTO: 27.5 PG (ref 26.8–34.3)
MCHC RBC AUTO-ENTMCNC: 30.5 G/DL (ref 31.4–37.4)
MCV RBC AUTO: 90 FL (ref 82–98)
NONHDLC SERPL-MCNC: 81 MG/DL
PLATELET # BLD AUTO: 239 THOUSANDS/UL (ref 149–390)
PMV BLD AUTO: 11.5 FL (ref 8.9–12.7)
POTASSIUM SERPL-SCNC: 3.7 MMOL/L (ref 3.5–5.3)
PROT SERPL-MCNC: 7.6 G/DL (ref 6.4–8.2)
RBC # BLD AUTO: 3.63 MILLION/UL (ref 3.81–5.12)
SODIUM SERPL-SCNC: 141 MMOL/L (ref 136–145)
TRIGL SERPL-MCNC: 67 MG/DL
TSH SERPL DL<=0.05 MIU/L-ACNC: 1.02 UIU/ML (ref 0.36–3.74)
VIT B12 SERPL-MCNC: 1269 PG/ML (ref 100–900)
WBC # BLD AUTO: 5.43 THOUSAND/UL (ref 4.31–10.16)

## 2020-12-15 PROCEDURE — 82607 VITAMIN B-12: CPT

## 2020-12-15 PROCEDURE — 80053 COMPREHEN METABOLIC PANEL: CPT

## 2020-12-15 PROCEDURE — 82746 ASSAY OF FOLIC ACID SERUM: CPT

## 2020-12-15 PROCEDURE — 36415 COLL VENOUS BLD VENIPUNCTURE: CPT

## 2020-12-15 PROCEDURE — 80061 LIPID PANEL: CPT

## 2020-12-15 PROCEDURE — 85027 COMPLETE CBC AUTOMATED: CPT

## 2020-12-15 PROCEDURE — 84443 ASSAY THYROID STIM HORMONE: CPT

## 2020-12-18 ENCOUNTER — TELEPHONE (OUTPATIENT)
Dept: FAMILY MEDICINE CLINIC | Facility: CLINIC | Age: 70
End: 2020-12-18

## 2020-12-18 DIAGNOSIS — D64.9 ANEMIA, UNSPECIFIED TYPE: ICD-10-CM

## 2020-12-18 DIAGNOSIS — E83.52 HYPERCALCEMIA: Primary | ICD-10-CM

## 2020-12-18 DIAGNOSIS — N18.4 CKD (CHRONIC KIDNEY DISEASE) STAGE 4, GFR 15-29 ML/MIN (HCC): Primary | ICD-10-CM

## 2020-12-22 ENCOUNTER — TELEPHONE (OUTPATIENT)
Dept: FAMILY MEDICINE CLINIC | Facility: CLINIC | Age: 70
End: 2020-12-22

## 2020-12-22 ENCOUNTER — TELEMEDICINE (OUTPATIENT)
Dept: FAMILY MEDICINE CLINIC | Facility: CLINIC | Age: 70
End: 2020-12-22
Payer: COMMERCIAL

## 2020-12-22 ENCOUNTER — TELEPHONE (OUTPATIENT)
Dept: LAB | Facility: HOSPITAL | Age: 70
End: 2020-12-22

## 2020-12-22 DIAGNOSIS — N18.4 CKD (CHRONIC KIDNEY DISEASE) STAGE 4, GFR 15-29 ML/MIN (HCC): ICD-10-CM

## 2020-12-22 DIAGNOSIS — Z78.9 NEED FOR FOLLOW-UP BY SOCIAL WORKER: ICD-10-CM

## 2020-12-22 DIAGNOSIS — R41.89 COGNITIVE DECLINE: Primary | ICD-10-CM

## 2020-12-22 DIAGNOSIS — F99 PSYCHIATRIC DISORDER: ICD-10-CM

## 2020-12-22 DIAGNOSIS — E83.52 HYPERCALCEMIA: ICD-10-CM

## 2020-12-22 PROBLEM — F03.90 MAJOR NEUROCOGNITIVE DISORDER (HCC): Chronic | Status: RESOLVED | Noted: 2020-11-24 | Resolved: 2020-12-22

## 2020-12-22 PROCEDURE — 99213 OFFICE O/P EST LOW 20 MIN: CPT | Performed by: FAMILY MEDICINE

## 2020-12-22 PROCEDURE — 1160F RVW MEDS BY RX/DR IN RCRD: CPT | Performed by: FAMILY MEDICINE

## 2020-12-22 PROCEDURE — 1036F TOBACCO NON-USER: CPT | Performed by: FAMILY MEDICINE

## 2020-12-23 ENCOUNTER — TELEPHONE (OUTPATIENT)
Dept: INTERNAL MEDICINE CLINIC | Facility: CLINIC | Age: 70
End: 2020-12-23

## 2020-12-23 ENCOUNTER — PATIENT OUTREACH (OUTPATIENT)
Dept: FAMILY MEDICINE CLINIC | Facility: CLINIC | Age: 70
End: 2020-12-23

## 2020-12-23 NOTE — TELEPHONE ENCOUNTER
I did check into [facility] to get bloodwork completed and this pt is scheduled for blood work tomorrow   BILLY

## 2020-12-23 NOTE — TELEPHONE ENCOUNTER
[facility]  I spoke with Dasha Padilla about the need for this patient to have follow up due to worsening labwork and sons reluctance to follow medical recommendations  Dr Beverley Encinas will call to Dasha Padilla tomorrow to discuss  Dasha Padilla can be reached at [phone]

## 2020-12-24 ENCOUNTER — LAB (OUTPATIENT)
Dept: LAB | Facility: HOSPITAL | Age: 70
End: 2020-12-24
Payer: COMMERCIAL

## 2020-12-24 ENCOUNTER — TELEPHONE (OUTPATIENT)
Dept: FAMILY MEDICINE CLINIC | Facility: CLINIC | Age: 70
End: 2020-12-24

## 2020-12-24 DIAGNOSIS — E83.52 HYPERCALCEMIA: ICD-10-CM

## 2020-12-24 DIAGNOSIS — D64.9 ANEMIA, UNSPECIFIED TYPE: ICD-10-CM

## 2020-12-24 DIAGNOSIS — N18.4 CKD (CHRONIC KIDNEY DISEASE) STAGE 4, GFR 15-29 ML/MIN (HCC): ICD-10-CM

## 2020-12-24 LAB
25(OH)D3 SERPL-MCNC: >135 NG/ML (ref 30–100)
ANION GAP SERPL CALCULATED.3IONS-SCNC: 7 MMOL/L (ref 4–13)
BUN SERPL-MCNC: 40 MG/DL (ref 5–25)
CA-I BLD-SCNC: 1.54 MMOL/L (ref 1.12–1.32)
CALCIUM SERPL-MCNC: 12.2 MG/DL (ref 8.3–10.1)
CHLORIDE SERPL-SCNC: 110 MMOL/L (ref 100–108)
CO2 SERPL-SCNC: 25 MMOL/L (ref 21–32)
CREAT SERPL-MCNC: 1.54 MG/DL (ref 0.6–1.3)
FERRITIN SERPL-MCNC: 310 NG/ML (ref 8–388)
GFR SERPL CREATININE-BSD FRML MDRD: 34 ML/MIN/1.73SQ M
GLUCOSE SERPL-MCNC: 86 MG/DL (ref 65–140)
IRON SATN MFR SERPL: 19 %
IRON SERPL-MCNC: 71 UG/DL (ref 50–170)
PHOSPHATE SERPL-MCNC: 3.8 MG/DL (ref 2.3–4.1)
POTASSIUM SERPL-SCNC: 3.5 MMOL/L (ref 3.5–5.3)
PTH-INTACT SERPL-MCNC: 28.1 PG/ML (ref 18.4–80.1)
SODIUM SERPL-SCNC: 142 MMOL/L (ref 136–145)
TIBC SERPL-MCNC: 373 UG/DL (ref 250–450)

## 2020-12-24 PROCEDURE — 82652 VIT D 1 25-DIHYDROXY: CPT

## 2020-12-24 PROCEDURE — 80048 BASIC METABOLIC PNL TOTAL CA: CPT

## 2020-12-24 PROCEDURE — 83540 ASSAY OF IRON: CPT

## 2020-12-24 PROCEDURE — 82330 ASSAY OF CALCIUM: CPT

## 2020-12-24 PROCEDURE — 82728 ASSAY OF FERRITIN: CPT

## 2020-12-24 PROCEDURE — 82306 VITAMIN D 25 HYDROXY: CPT

## 2020-12-24 PROCEDURE — 36415 COLL VENOUS BLD VENIPUNCTURE: CPT

## 2020-12-24 PROCEDURE — 84100 ASSAY OF PHOSPHORUS: CPT

## 2020-12-24 PROCEDURE — 83550 IRON BINDING TEST: CPT

## 2020-12-24 PROCEDURE — 83970 ASSAY OF PARATHORMONE: CPT

## 2020-12-24 PROCEDURE — 82397 CHEMILUMINESCENT ASSAY: CPT

## 2020-12-28 LAB — 1,25(OH)2D3 SERPL-MCNC: 104 PG/ML (ref 19.9–79.3)

## 2021-01-02 LAB — PTH RELATED PROT SERPL-SCNC: <2 PMOL/L

## 2021-01-07 ENCOUNTER — TELEPHONE (OUTPATIENT)
Dept: INTERNAL MEDICINE CLINIC | Facility: CLINIC | Age: 71
End: 2021-01-07

## 2021-01-07 NOTE — TELEPHONE ENCOUNTER
I did speak to Angeline Yañez from [facility]-she will keep an eye on the situation with this patient  She appears well cared for with the exception of following medical advice  We did not have a follow up visit with the patient as I do not believe the son will come back to the office due to differences  Just wanted to let you know of my last conversation with the [facility]

## 2021-02-25 ENCOUNTER — VBI (OUTPATIENT)
Dept: ADMINISTRATIVE | Facility: OTHER | Age: 71
End: 2021-02-25

## 2021-04-09 ENCOUNTER — PATIENT OUTREACH (OUTPATIENT)
Dept: FAMILY MEDICINE CLINIC | Facility: CLINIC | Age: 71
End: 2021-04-09

## 2021-04-09 NOTE — PROGRESS NOTES
Telephone call placed to patient's son Inder Sethi and I left a message on his VM advising him to contact me if he needs any further assistance  I am closing this surveillance episode at this time but will remain available if patient's son reaches out

## 2021-08-26 ENCOUNTER — TELEPHONE (OUTPATIENT)
Dept: FAMILY MEDICINE CLINIC | Facility: CLINIC | Age: 71
End: 2021-08-26

## 2021-08-26 NOTE — TELEPHONE ENCOUNTER
Prescription/cerificate of medical necessity received from [employer] needing to be completed  Placed in Dr Seng Willis folder in preceptor room  Please advise  Thank you

## 2022-01-25 ENCOUNTER — TELEPHONE (OUTPATIENT)
Dept: FAMILY MEDICINE CLINIC | Facility: CLINIC | Age: 72
End: 2022-01-25

## 2022-01-25 NOTE — TELEPHONE ENCOUNTER
Hazel's son, Katerina Linn, called and stated that they ordered a bath chair for Acey Ivon but it still has not been cleared  It was ordered from [employer]  Katerina Linn can be reached at  [phone]  Please advise, thank you!

## 2022-02-03 NOTE — TELEPHONE ENCOUNTER
Please call to clarify that Neyda Hylton is still under our care  Per our last discussion Hazel's son had withdrawn her from our clinic and canceled all appointments  If the son would like to continue care with us he should schedule a visit with one of the providers in our office  Thank you

## 2022-02-14 ENCOUNTER — TELEPHONE (OUTPATIENT)
Dept: FAMILY MEDICINE CLINIC | Facility: CLINIC | Age: 72
End: 2022-02-14

## 2022-02-14 NOTE — TELEPHONE ENCOUNTER
Patient's son called and said that the social security office needs a letter indicating that she is unable to take care of herself  He asked if you could give him a call, he can be reached at [phone]  Please advise, thank you!

## 2022-02-17 NOTE — TELEPHONE ENCOUNTER
Patient had withdrawn care after my last visit with his mother and has not followed-up since  I would be happy to re-evaluate her and then we can discuss any other letters that her son requires  But I cannot speak to her status until I evaluate her again as it has been a long time since I last saw her  Please call the son and let him know  Thanks

## 2022-02-21 NOTE — TELEPHONE ENCOUNTER
Spoke to patient's son  She is still under our care but he stated she has not been sick and did not need anything  Patient is scheduled 2/24 at 2:40

## 2022-02-22 ENCOUNTER — TELEPHONE (OUTPATIENT)
Dept: PSYCHIATRY | Facility: CLINIC | Age: 72
End: 2022-02-22

## 2022-02-22 NOTE — TELEPHONE ENCOUNTER
Received Social Security Administration "Physican/medical officer's statement of a patient's capability to manage funds" form  Completed on behalf of provider, obtained provider signature      Placed in outgoing mail to:  Social Security Administration  [address] [facility]

## 2022-02-24 ENCOUNTER — TELEMEDICINE (OUTPATIENT)
Dept: FAMILY MEDICINE CLINIC | Facility: CLINIC | Age: 72
End: 2022-02-24
Payer: COMMERCIAL

## 2022-02-24 DIAGNOSIS — H91.90 HEARING DISORDER, UNSPECIFIED LATERALITY: ICD-10-CM

## 2022-02-24 DIAGNOSIS — E83.52 HYPERCALCEMIA DUE TO HYPERVITAMINOSIS D: ICD-10-CM

## 2022-02-24 DIAGNOSIS — R64 CACHEXIA (HCC): ICD-10-CM

## 2022-02-24 DIAGNOSIS — R53.82 CHRONIC FATIGUE: ICD-10-CM

## 2022-02-24 DIAGNOSIS — M06.9 RHEUMATOID ARTHRITIS INVOLVING BOTH HANDS, UNSPECIFIED WHETHER RHEUMATOID FACTOR PRESENT (HCC): Primary | ICD-10-CM

## 2022-02-24 DIAGNOSIS — R41.89 COGNITIVE DECLINE: ICD-10-CM

## 2022-02-24 DIAGNOSIS — E67.3 HYPERCALCEMIA DUE TO HYPERVITAMINOSIS D: ICD-10-CM

## 2022-02-24 PROBLEM — Z99.3 WHEELCHAIR BOUND: Status: ACTIVE | Noted: 2022-02-24

## 2022-02-24 PROCEDURE — 1036F TOBACCO NON-USER: CPT | Performed by: FAMILY MEDICINE

## 2022-02-24 PROCEDURE — 99213 OFFICE O/P EST LOW 20 MIN: CPT | Performed by: FAMILY MEDICINE

## 2022-02-24 PROCEDURE — 1160F RVW MEDS BY RX/DR IN RCRD: CPT | Performed by: FAMILY MEDICINE

## 2022-02-24 NOTE — ASSESSMENT & PLAN NOTE
POA has consistently refused all recommended treatment options and referral to rheumatology both in past and currently  Discussed complications of RA  Wheelchair bound patient  Plan:   - OTC analgesics: tylenol max 3g/d  Avoid NSAIDs such as ibuprofen/motrin/aleve given stage 4 CKD

## 2022-02-24 NOTE — ASSESSMENT & PLAN NOTE
Psychiatry previously recommended head CT as they felt what was thought to be psychiatric disorder may have been 2/2 cognitive decline  Had discussed referral to Geriatrics but per chart review there was failure to f/u with geriatrics and at that time son had stated it was difficult to take patient into office for visit with geratrics as patient is wheelchair bound  Plan:  - revisited discussion regarding CT head and/or geriatrics f/u

## 2022-02-24 NOTE — ASSESSMENT & PLAN NOTE
Lab Results   Component Value Date    EGFR 34 12/24/2020    EGFR 19 12/15/2020    CREATININE 1 54 (H) 12/24/2020    CREATININE 2 54 (H) 12/15/2020     Previously had recommended referral to Nephrology and discussed purpose of this referral including closer monitoring of CKD and electrolyte disturbances common in CKD as well as mgmt prn, discussion about dietary modifications but was refused by POA  Plan:   - CMP  Will assess Cr and eGFR  - revisited discussion about nephrology referral but was again refused

## 2022-02-24 NOTE — PROGRESS NOTES
Family Medicine Office Visit  Geno Wang 81 y o  female   MRN: [id number] : 1950  ENCOUNTER: 2022 3:03 PM    Assessment & Plan     1  Rheumatoid arthritis involving both hands, unspecified whether rheumatoid factor present ([facility] )   - wheelchair bound / non-ambulatory 2/2 severe RA   - son has refused all treatment for RA  Have discussed this at length but son [facility]) is adamant  Discussed risks/benefits of treatment and consequences of not starting treatment, son verbalized understanding and was agreeable  Ambulatory Referral to Physical Therapy for wheel chair evaluation  Son reports patient slips from current wheel chair and is concerned about safety  Would like wheelchair evaluation  2  Cognitive decline  Son requesting letter stating patient has cognitive decline in order to have social security begin sending her monthly social security payment to the [place] (son and POA) account  No MoCA or Mindstream eval done as of yet though there is a high suspicion of some degree of cognitive decline    - Will need objective data  Since son not wanting to go to geriatrics for further eval and mindstream will need in office evaluation and MoCA to be able to provide this letter to [address] [place] [address]  3  Hypercalcemia due to hypervitaminosis D  Comprehensive metabolic panel   - calcium and vitamin D had improved but were not in normal range when POA withdrew care 2020 until recently  Vitamin D 25 hydroxy   4  Cachexia ([facility] )   - appears cachectic on video visit  - advised to continue nutrition supplementation with Ensure or Boost (or other) when pt not eating solid foods  - monitor weight   - will check albumin on CMP  5  Hearing disorder, unspecified laterality   - per son  Would like testing  Ambulatory Referral to Audiology   6  Chronic fatigue    CBC and Platelet   - son denies bloody/black/tarry stools  - did not have anemia on previous blood work in    Advised son to hold the iron supplement which he's been giving her until we are able to identify that she in fact does have anemia (iron deficiency anemia)  TSH, 3rd generation                       Son (POA) verbalizes understanding and agrees with the plan  No follow-ups on file  Subjective   CC: No chief complaint on file  HPI  Michelle Neff is a 81y o -year-old female who  has a past medical history of Anxiety, Depression, High cholesterol, Hypertension, Psychiatric disorder, and Rheumatoid arthritis ([address] )        Today she presents for No chief complaint on file  Dasha Tolliver Son requesting note for social security as "information was compromised while on vacation and we had to close all of my moms bank accounts and redirect her monthly deposits from her to my (son's) account because she can't take care of her own finances and that son (her POA) needs to be designated as her payee " States social security requires note from her doctor statin she cannot care for herself and needs payee to take care of finances   Reports gaining weight    o Stopped nutrition supplementation with Ensure because son has concern that she is taking in too much sugar and because she is eating more solid foods  o Have been using OTC supplementation   Having BM every other day   Has continued incontinence issues  Son states she is getting incontinence supplies through [employer]   Picks at her skin on her hands but not breaking skin  Wondering about options for safe guards for her hands   Also son feels that she may have decreased hearing and would like a hearing test     At times is "talking to someone that isn't there "     Needs fitting for wheelchair  Older wheelchair was too bid and "she would slide off of it and tip to the side "   o Would like to know options for getting insurance   Needs f/u labs including something decreased energy/fatigue  Would like mobile lab Annemarie tran as pt is wheelchair bound       Review of Systems    No Known Allergies    Past Medical History, Past Surgical History, Family History, and Social History were reviewed and updated today as appropriate  Objective   There were no vitals taken for this visit  Physical Exam ***      Labs: I have reviewed all lab results           Chema Doherty MD   [address]    Parts of this note were dictated using M*Modal dictation software

## 2022-02-24 NOTE — ASSESSMENT & PLAN NOTE
Malnutrition Findings:           BMI Findings: There is no height or weight on file to calculate BMI  Last CMP in 12/2020 revealed albumin of 3 8  Has decreased po intake of solid foods suspected to be due to decreased appetite  Plan:   - continue nutrition supplements (eg  Ensure)   - monitor weight   - CMP, will assess albumin and electrolytes

## 2022-02-24 NOTE — ASSESSMENT & PLAN NOTE
At last encounter in 12/2020, prior to patient's POA (son) cancelling all f/u appts, patient was noted to have Ca >14 with a vit D level >100  Had instructed son to discontinue vitamin D but was lost to f/u Today son reports they have continued to hold vitamin D**  Plan:   - CMP recheck calcium level  - recheck vitamin D level

## 2022-02-24 NOTE — PROGRESS NOTES
Virtual Regular Visit    Verification of patient location:    Patient is located in the following state in which I hold an active license [place]      Assessment/Plan:    1  Rheumatoid arthritis involving both hands, unspecified whether rheumatoid factor present ([facility] [address] )   - wheelchair bound / non-ambulatory 2/2 severe RA   - son has refused all treatment for RA  Have discussed this at length but son [facility]) is adamant  Discussed risks/benefits of treatment and consequences of not starting treatment, son verbalized understanding and was agreeable  Ambulatory Referral to Physical Therapy for wheel chair evaluation  Son reports patient slips from current wheel chair and is concerned about safety  Would like wheelchair evaluation  2  Cognitive decline  Son requesting letter stating patient has cognitive decline in order to have social security begin sending her monthly social security payment to the [place] (son and POA) account  No MoCA or Mindstream eval done as of yet though there is a high suspicion of some degree of cognitive decline    - Will need objective data  Since son not wanting to go to geriatrics for further eval and mindstream will need in office evaluation and MoCA to be able to provide this letter to [address] [place] [address]  3  Hypercalcemia due to hypervitaminosis D  Comprehensive metabolic panel   - calcium and vitamin D had improved but were not in normal range when POA withdrew care 12/2020 until recently  Vitamin D 25 hydroxy   4  Cachexia ([facility] [address] )   - appears cachectic on video visit  - advised to continue nutrition supplementation with Ensure or Boost (or other) when pt not eating solid foods  - monitor weight   - will check albumin on CMP  5  Hearing disorder, unspecified laterality   - per son  Would like testing  Ambulatory Referral to Audiology   6  Chronic fatigue    CBC and Platelet   - son denies bloody/black/tarry stools  - did not have anemia on previous blood work in 2020   Advised son to hold the iron supplement which he's been giving her until we are able to identify that she in fact does have anemia (iron deficiency anemia)  TSH, 3rd generation        Son (POA) verbalizes understanding and agrees with the plan  Reason for visit is   Chief Complaint   Patient presents with    Memory Issues     cognitive decline issues         Encounter provider Tavo Jason MD    Provider located at [address] [place] [address] [address] [facility]  [phone]      Recent Visits  No visits were found meeting these conditions  Showing recent visits within past 7 days and meeting all other requirements  Today's Visits  Date Type Provider Dept   02/24/22 Telemedicine Tavo Jason MD [facility]   Showing today's visits and meeting all other requirements  Future Appointments  No visits were found meeting these conditions  Showing future appointments within next 150 days and meeting all other requirements       The patient was identified by name and date of birth  Zari Soria was informed that this is a telemedicine visit and that the visit is being conducted through [address] [place] [address] [facility] [employer] and patient was informed that this is a secure, HIPAA-compliant platform  She agrees to proceed     My office door was closed  No one else was in the room  She acknowledged consent and understanding of privacy and security of the video platform  The patient has agreed to participate and understands they can discontinue the visit at any time  Patient is aware this is a billable service  Subjective  Zari Soria is a 81y o -year-old female who  has a past medical history of Anxiety, Depression, High cholesterol, Hypertension, Psychiatric disorder, and Rheumatoid arthritis ([address] )        Today she presents for No chief complaint on file  Kailey Esposito Son requesting note for social security as "information was compromised while on vacation and we had to close all of my moms bank accounts and redirect her monthly deposits from her to my (son's) account because she can't take care of her own finances and that son (her POA) needs to be designated as her payee " States social security requires note from her doctor statin she cannot care for herself and needs payee to take care of finances   Reports gaining weight    o Stopped nutrition supplementation with Ensure because son has concern that she is taking in too much sugar and because she is eating more solid foods  o Have been using OTC supplementation   Having BM every other day   Has continued incontinence issues  Son states she is getting incontinence supplies through [employer]   Picks at her skin on her hands but not breaking skin  Wondering about options for safe guards for her hands   Also son feels that she may have decreased hearing and would like a hearing test     At times is "talking to someone that isn't there "     Needs fitting for wheelchair  Older wheelchair was too bid and "she would slide off of it and tip to the side "   o Would like to know options for getting insurance   Needs f/u labs including something decreased energy/fatigue  Would like mobile lab Yvette Daigle as pt is wheelchair bound         Past Medical History:   Diagnosis Date    Anxiety     Depression     High cholesterol     Hypertension     Psychiatric disorder     Rheumatoid arthritis ([facility] [address] )        Past Surgical History:   Procedure Laterality Date    CHOLECYSTECTOMY      COLONOSCOPY      EYE SURGERY Bilateral 2012    HIP SURGERY Left 2010    HYSTERECTOMY      LINUS and ?BSO    IR PICC PLACEMENT SINGLE LUMEN  1/11/2019    MAMMO (HISTORICAL)  03/2017       Current Outpatient Medications   Medication Sig Dispense Refill    ibuprofen (MOTRIN) 400 mg tablet Take by mouth every 6 (six) hours as needed      Nutritional Supplements (Ensure Original) LIQD Take 1 Bottle by mouth 3 (three) times a day 90 Bottle 2     No current facility-administered medications for this visit  No Known Allergies  (ROS below per son)  Review of Systems   Constitutional: Negative for diaphoresis and fever  HENT: Positive for hearing loss  Cardiovascular: Negative for leg swelling  Gastrointestinal: Negative for abdominal distention  Genitourinary:        URINARY incontinence    Musculoskeletal: Positive for arthralgias  Back pain: 2/2 RA  Skin: Negative for rash and wound  Video Exam    There were no vitals filed for this visit  Physical Exam  Constitutional:       General: She is not in acute distress  Appearance: She is ill-appearing (chronically)  She is not diaphoretic  Comments: Cachectic    HENT:      Nose: Nose normal    Pulmonary:      Effort: Pulmonary effort is normal    Neurological:      Mental Status: She is alert  I spent 35 minutes directly with the patient during this visit    VIRTUAL VISIT Aditi verbally agrees to participate in [employer]  Pt is aware that [employer] could be limited without vital signs or the ability to perform a full hands-on physical Petty Severance understands she or the provider may request at any time to terminate the video visit and request the patient to seek care or treatment in person

## 2022-03-03 ENCOUNTER — RA CDI HCC (OUTPATIENT)
Dept: OTHER | Facility: HOSPITAL | Age: 72
End: 2022-03-03

## 2022-03-03 NOTE — PROGRESS NOTES
[facility]  coding opportunities       Chart reviewed, no opportunity found: CHART REVIEWED, NO OPPORTUNITY FOUND                        Patients insurance company: [employer]

## 2022-03-10 ENCOUNTER — TELEPHONE (OUTPATIENT)
Dept: LAB | Facility: HOSPITAL | Age: 72
End: 2022-03-10

## 2022-03-12 ENCOUNTER — LAB (OUTPATIENT)
Dept: LAB | Facility: HOSPITAL | Age: 72
End: 2022-03-12
Payer: COMMERCIAL

## 2022-03-12 DIAGNOSIS — E83.52 HYPERCALCEMIA DUE TO HYPERVITAMINOSIS D: ICD-10-CM

## 2022-03-12 DIAGNOSIS — R53.82 CHRONIC FATIGUE: ICD-10-CM

## 2022-03-12 DIAGNOSIS — E67.3 HYPERCALCEMIA DUE TO HYPERVITAMINOSIS D: ICD-10-CM

## 2022-03-13 ENCOUNTER — APPOINTMENT (OUTPATIENT)
Dept: LAB | Facility: HOSPITAL | Age: 72
End: 2022-03-13
Payer: COMMERCIAL

## 2022-03-13 LAB
25(OH)D3 SERPL-MCNC: 69.5 NG/ML (ref 30–100)
ALBUMIN SERPL BCP-MCNC: 3 G/DL (ref 3.5–5)
ALP SERPL-CCNC: 129 U/L (ref 46–116)
ALT SERPL W P-5'-P-CCNC: 17 U/L (ref 12–78)
ANION GAP SERPL CALCULATED.3IONS-SCNC: 7 MMOL/L (ref 4–13)
AST SERPL W P-5'-P-CCNC: 20 U/L (ref 5–45)
BILIRUB SERPL-MCNC: 0.51 MG/DL (ref 0.2–1)
BUN SERPL-MCNC: 18 MG/DL (ref 5–25)
CALCIUM ALBUM COR SERPL-MCNC: 10.2 MG/DL (ref 8.3–10.1)
CALCIUM SERPL-MCNC: 9.4 MG/DL (ref 8.3–10.1)
CHLORIDE SERPL-SCNC: 112 MMOL/L (ref 100–108)
CO2 SERPL-SCNC: 24 MMOL/L (ref 21–32)
CREAT SERPL-MCNC: 0.51 MG/DL (ref 0.6–1.3)
ERYTHROCYTE [DISTWIDTH] IN BLOOD BY AUTOMATED COUNT: 15.1 % (ref 11.6–15.1)
GFR SERPL CREATININE-BSD FRML MDRD: 97 ML/MIN/1.73SQ M
GLUCOSE SERPL-MCNC: 84 MG/DL (ref 65–140)
HCT VFR BLD AUTO: 33.8 % (ref 34.8–46.1)
HGB BLD-MCNC: 10.8 G/DL (ref 11.5–15.4)
MCH RBC QN AUTO: 26.6 PG (ref 26.8–34.3)
MCHC RBC AUTO-ENTMCNC: 32 G/DL (ref 31.4–37.4)
MCV RBC AUTO: 83 FL (ref 82–98)
PLATELET # BLD AUTO: 219 THOUSANDS/UL (ref 149–390)
PMV BLD AUTO: 10.6 FL (ref 8.9–12.7)
POTASSIUM SERPL-SCNC: 4.2 MMOL/L (ref 3.5–5.3)
PROT SERPL-MCNC: 6.5 G/DL (ref 6.4–8.2)
RBC # BLD AUTO: 4.06 MILLION/UL (ref 3.81–5.12)
SODIUM SERPL-SCNC: 143 MMOL/L (ref 136–145)
TSH SERPL DL<=0.05 MIU/L-ACNC: 1.73 UIU/ML (ref 0.36–3.74)
WBC # BLD AUTO: 4.27 THOUSAND/UL (ref 4.31–10.16)

## 2022-03-13 PROCEDURE — 84443 ASSAY THYROID STIM HORMONE: CPT

## 2022-03-13 PROCEDURE — 82306 VITAMIN D 25 HYDROXY: CPT

## 2022-03-13 PROCEDURE — 36415 COLL VENOUS BLD VENIPUNCTURE: CPT

## 2022-03-13 PROCEDURE — 85027 COMPLETE CBC AUTOMATED: CPT

## 2022-03-13 PROCEDURE — 80053 COMPREHEN METABOLIC PANEL: CPT

## 2022-03-14 ENCOUNTER — OFFICE VISIT (OUTPATIENT)
Dept: FAMILY MEDICINE CLINIC | Facility: CLINIC | Age: 72
End: 2022-03-14
Payer: COMMERCIAL

## 2022-03-14 VITALS
DIASTOLIC BLOOD PRESSURE: 70 MMHG | HEART RATE: 102 BPM | WEIGHT: 72 LBS | OXYGEN SATURATION: 97 % | TEMPERATURE: 97.6 F | SYSTOLIC BLOOD PRESSURE: 130 MMHG

## 2022-03-14 DIAGNOSIS — N18.4 CKD (CHRONIC KIDNEY DISEASE) STAGE 4, GFR 15-29 ML/MIN (HCC): ICD-10-CM

## 2022-03-14 DIAGNOSIS — R64 CACHECTIC (HCC): ICD-10-CM

## 2022-03-14 DIAGNOSIS — E87.8 HYPERCHLOREMIA: ICD-10-CM

## 2022-03-14 DIAGNOSIS — R41.89 COGNITIVE DECLINE: Primary | ICD-10-CM

## 2022-03-14 DIAGNOSIS — E46 MALNUTRITION, UNSPECIFIED TYPE (HCC): ICD-10-CM

## 2022-03-14 PROCEDURE — 99213 OFFICE O/P EST LOW 20 MIN: CPT | Performed by: FAMILY MEDICINE

## 2022-03-14 PROCEDURE — 1036F TOBACCO NON-USER: CPT | Performed by: FAMILY MEDICINE

## 2022-03-14 PROCEDURE — 1160F RVW MEDS BY RX/DR IN RCRD: CPT | Performed by: FAMILY MEDICINE

## 2022-03-14 RX ORDER — CALORIC SUPPLEMENT
1 LIQUID (ML) ORAL 3 TIMES DAILY PRN
Qty: 237 ML | Refills: 0
Start: 2022-03-14

## 2022-03-14 NOTE — PROGRESS NOTES
Family Medicine Office Visit  Ambika Patterson 81 y o  female   MRN: [id number] : 1950  ENCOUNTER: 3/14/2022 3:41 PM    Assessment & Plan     Chronically ill 80 yo F who is wheelchair bound due to advanced RA that is untreated as per POA's decision  1  Cognitive decline  Son (POA) needs letter for social security office in order to be payee to help manage patient's finances as she is unable to do so  MMSE  (used professional  service)  Of note per son patient graduated [employer] in [place]    - provided letter for social security, staff will fax it to NAGI GAFFNEY [facility] office    - discussed importance of learning new things, new challenges such as puzzles, word search etc  Also social connections  Options for medications but son would like to avoid meds  Do not want to go to geriatrics  2  Hyperchloremia  112  Last 110 2020  Possible due to CKD vs ?dehydration though less likely vs unlikely due to increased intake given normal sodium though son states he is giving her "electrolytes " K+ wnl    - refuse nephrology referral    - encouraged hydration  - encourage p o  meals and nutritional supplement  Instructed son to stop the "electrolyte mixture" he is providing her  - will repeat a follow-up  3  CKD (chronic kidney disease) stage 4, GFR 15-29 ml/min ([facility])  Unclear etiology  2/2 ?vascular disease vs ?RA/autoimmune   - refused referral to nephrology  POA (son) verbalized understanding of severity of stage 4 CKD but would not like to try any medications or treatments such as dialysis  He verbalized understanding of risks of not following care guidelines including electrolyte and acid/base disturbances that can be life threatening       4  Malnutrition, unspecified type ([address] )  2/2 poor po intake though son reports some improvement    - Nutritional Supplements (Ensure Original) LIQD; Take 1 Bottle by mouth 3 (three) times a day as needed (to supplement meals)  Dispense: 237 mL; Refill: 0    5  Cachectic ([facility] [address] )  As above  - Nutritional Supplements (Ensure Original) LIQD; Take 1 Bottle by mouth 3 (three) times a day as needed (to supplement meals)  Dispense: 237 mL; Refill: 0         Call office if sxs worsen/fail to improve  ED precautions emphasized  POA (son) verbalizes understanding and agrees with the plan  Return in about 3 months (around 6/14/2022) for Recheck labs (virtual)  or sooner prn  Subjective   CC: Follow-up      HPI  Celestina Baez is a 81y o -year-old female who  has a past medical history of Anxiety, Depression, High cholesterol, Hypertension, Psychiatric disorder, and Rheumatoid arthritis ([facility] [address] )        Today she presents for Follow-up of cognitive decline  Brought in by son who states he is POA  Sinus requesting letter for social security office stating that patient is unable to manage her own finances so that son may help to manage her finances  There is no documented evidence of patient having dementia adult son states that her previous provider did a diagnosed her with this  As we do not have any objective day stating as such she is here today for a mini-mental status exam     Per son patient taking more po but still not wanting to drink water though she will if its given in small quantities  AAO x1/3, oriented to person but not to time or place which is her baseline per son  Patient states she's anxious about being out of the house  Review of Systems   Constitutional: Negative for chills and fatigue  Cardiovascular: Negative for chest pain and palpitations  Musculoskeletal: Positive for arthralgias  Psychiatric/Behavioral: Negative for behavioral problems, hallucinations and self-injury  The patient is not hyperactive  No Known Allergies    Past Medical History, Past Surgical History, Family History, and Social History were reviewed and updated today as appropriate        Objective   /70   Pulse 102   Temp 97 6 °F (36 4 °C) Wt 32 7 kg (72 lb)   SpO2 97%     Physical Exam  Vitals reviewed  Constitutional:       General: She is not in acute distress  Appearance: She is ill-appearing (Chronically)  She is not toxic-appearing or diaphoretic  Cardiovascular:      Rate and Rhythm: Regular rhythm  Tachycardia present  Pulses: Normal pulses  Comments: Tachy possibly due to anxiety   Pulmonary:      Effort: Pulmonary effort is normal    Musculoskeletal:      Right hand: Deformity present  Left hand: Deformity present  Right ankle: Deformity present  Left ankle: Deformity present  Right foot: Deformity present  Left foot: Deformity present  Neurological:      Mental Status: She is alert  Psychiatric:      Comments: Appears anxious about MMSE, able to calm and redirect with reassurance  Labs: I have reviewed all lab results           Chema Sal MD   [address]    Parts of this note were dictated using [employer] dictation software

## 2022-03-14 NOTE — LETTER
March 14, 2022     Patient: Hanh Hawkins   YOB: 1950   Date of Visit: 3/14/2022       To Whom it May Concern:    Hanh Hawkins is under my professional care  She was seen in my office on 3/14/2022  This letter is to inform you of the knowledge that our office has of Hanh Hawkins having cognitive decline  Of note she also is disabled and is wheelchair-bound  These conditions have made it difficult for her to manage her own finances  If you have any questions or concerns, please don't hesitate to call           Sincerely,          Mary Villeda MD

## 2022-05-25 ENCOUNTER — TELEPHONE (OUTPATIENT)
Dept: FAMILY MEDICINE CLINIC | Facility: CLINIC | Age: 72
End: 2022-05-25

## 2022-05-25 DIAGNOSIS — Z99.3 WHEELCHAIR DEPENDENT: ICD-10-CM

## 2022-05-25 DIAGNOSIS — M06.9 RHEUMATOID ARTHRITIS INVOLVING MULTIPLE SITES, UNSPECIFIED WHETHER RHEUMATOID FACTOR PRESENT (HCC): Primary | ICD-10-CM

## 2022-05-25 NOTE — TELEPHONE ENCOUNTER
Service provider from [address] called and needs an order put in from a provider to order a wheel chair for pt  They said they may also need an ot/pt evaluation as well

## 2022-05-26 NOTE — TELEPHONE ENCOUNTER
Order placed  If insurance confirm that they in fact need PT/OT eval please have them clarify that they would require a functional capacity assessment and update me at which point I will place the order

## 2022-06-20 ENCOUNTER — VBI (OUTPATIENT)
Dept: ADMINISTRATIVE | Facility: OTHER | Age: 72
End: 2022-06-20

## 2022-06-22 ENCOUNTER — TELEMEDICINE (OUTPATIENT)
Dept: FAMILY MEDICINE CLINIC | Facility: CLINIC | Age: 72
End: 2022-06-22
Payer: COMMERCIAL

## 2022-06-22 DIAGNOSIS — N18.4 CKD (CHRONIC KIDNEY DISEASE) STAGE 4, GFR 15-29 ML/MIN (HCC): ICD-10-CM

## 2022-06-22 DIAGNOSIS — Z11.59 NEED FOR HEPATITIS C SCREENING TEST: ICD-10-CM

## 2022-06-22 DIAGNOSIS — R68.89 LACK OF INTEREST: ICD-10-CM

## 2022-06-22 DIAGNOSIS — E87.8 HYPERCHLOREMIA: ICD-10-CM

## 2022-06-22 DIAGNOSIS — E83.52 HYPERCALCEMIA: Primary | ICD-10-CM

## 2022-06-22 DIAGNOSIS — D72.819 LEUKOPENIA, UNSPECIFIED TYPE: ICD-10-CM

## 2022-06-22 DIAGNOSIS — D64.9 ANEMIA, UNSPECIFIED TYPE: ICD-10-CM

## 2022-06-22 PROCEDURE — 1036F TOBACCO NON-USER: CPT | Performed by: FAMILY MEDICINE

## 2022-06-22 PROCEDURE — 99213 OFFICE O/P EST LOW 20 MIN: CPT | Performed by: FAMILY MEDICINE

## 2022-06-22 PROCEDURE — 1160F RVW MEDS BY RX/DR IN RCRD: CPT | Performed by: FAMILY MEDICINE

## 2022-06-22 NOTE — ASSESSMENT & PLAN NOTE
Secondary to anemia of chronic disease (RA) vs CKD stage 4 vs iron deficiency anemia due to poor p o  intake     Plan:  CBC with diff to further assess leukopenia on prior blood work  Continue nutritional supplementation with Ensure  Call office if develops any signs or symptoms of bleeding including black/tarry/bloody stools and/or bloody/black vomitus

## 2022-06-22 NOTE — PROGRESS NOTES
Virtual Regular Visit    Verification of patient location:    Patient is located in the following state in which I hold an active license [place]      Assessment/Plan:    Problem List Items Addressed This Visit        Genitourinary    CKD (chronic kidney disease) stage 4, GFR 15-29 ml/min ([facility])     Lab Results   Component Value Date    EGFR 97 03/13/2022    EGFR 34 12/24/2020    EGFR 19 12/15/2020    CREATININE 0 51 (L) 03/13/2022    CREATININE 1 54 (H) 12/24/2020    CREATININE 2 54 (H) 12/15/2020     Patient's POA (son) refuses referral to Nephrology and dialysis  He verbalizes understanding of possible complications of not moving forward with treatment including electrolyte derangement, acid-base disorders, and death  He is agreeable to blood work to monitor electrolytes  Plan:  Blood work as listed in problem list to assess previously abnormal electrolytes  Relevant Orders    Comprehensive metabolic panel       Other    Hyperchloremia    Relevant Orders    Comprehensive metabolic panel    Lack of interest     Lack of interest, lack of motivation likely secondary to depression versus continued cognitive decline verses bipolar disorder (as reported by son but no record in chart) versus combination of the aforementioned  Patient is Irish-speaking and minimally conversant  Son is POA    Plan: Son who is POA previously resistant to use of antidepressants or other psychiatric medications but has today become agreeable to a trial of psychiatric medications  Considering patient has history of bipolar disorder she will likely need a combination of an antipsychotic and antidepressant the S I have asked our staff to schedule a visit with our psychiatry team in order to evaluate and initiate treatment accordingly  I discussed the need for psychiatry referral with patient's son and he is agreeable             Anemia     Secondary to anemia of chronic disease (RA) vs CKD stage 4 vs iron deficiency anemia due to poor p o  intake     Plan:  CBC with diff to further assess leukopenia on prior blood work  Continue nutritional supplementation with Ensure  Call office if develops any signs or symptoms of bleeding including black/tarry/bloody stools and/or bloody/black vomitus  Relevant Orders    CBC and differential      Other Visit Diagnoses     Hypercalcemia    -  Primary    Relevant Orders    Comprehensive metabolic panel    Leukopenia, unspecified type        Relevant Orders    CBC and differential    Need for hepatitis C screening test        Relevant Orders    Hepatitis C Antibody ([facility])        ED precautions advised  Return in about 3 months (around 9/22/2022) for Follow-up of cachexia and oral intake, suspected depression in setting of bipolar disorder             Reason for visit is   Chief Complaint   Patient presents with    Virtual Regular Visit          Encounter provider Ada Crooks MD    Provider located at [address] [place] [address]  [phone]      Recent Visits  No visits were found meeting these conditions  Showing recent visits within past 7 days and meeting all other requirements  Today's Visits  Date Type Provider Dept   06/22/22 Telemedicine Ada Crooks MD [facility]   Showing today's visits and meeting all other requirements  Future Appointments  No visits were found meeting these conditions  Showing future appointments within next 150 days and meeting all other requirements       The patient was identified by name and date of birth  Hayley Bird was informed that this is a telemedicine visit and that the visit is being conducted through [address] [facility] and patient was informed that this is a secure, HIPAA-compliant platform  She agrees to proceed     My office door was closed  No one else was in the room  She acknowledged consent and understanding of privacy and security of the video platform  The patient has agreed to participate and understands they can discontinue the visit at any time  Patient is aware this is a billable service  Subjective  Bria Agustin is a 81 y o  female who presents for: (history per son as patient does not speak [place] and usually does not speak much)  Follow-up of anemia, leukopenia, CKD stage 4, electrolyte abnormalities  Son also states that patient continues to have lack of motivation or interest in doing anything  She has put on some weight as they are supplementing with Ensure nutrition supplements  Son is now agreeable to discuss possible antidepressant with psychiatrist   Considering patient has history of bipolar disorder per son this condition will need to be managed by psychiatrist in order to ensure that antidepressant is prescribed with an antipsychotic to prevent pushing patient into penny             Past Medical History:   Diagnosis Date    Anxiety     Depression     High cholesterol     Hypertension     Psychiatric disorder     Rheumatoid arthritis ([facility] [address] )        Past Surgical History:   Procedure Laterality Date    CHOLECYSTECTOMY      COLONOSCOPY      EYE SURGERY Bilateral 2012    HIP SURGERY Left 2010    HYSTERECTOMY      LIUNS and ?BSO    IR PICC PLACEMENT SINGLE LUMEN  1/11/2019    MAMMO (HISTORICAL)  03/2017       Current Outpatient Medications   Medication Sig Dispense Refill    Nutritional Supplements (Ensure Original) LIQD Take 1 Bottle by mouth 3 (three) times a day as needed (to supplement meals) 237 mL 0     No current facility-administered medications for this visit  No Known Allergies    Review of Systems as above  Video Exam    There were no vitals filed for this visit  Physical Exam   It was my intent to perform this visit via video technology but the patient was not able to do a video connection so the visit was completed via audio telephone only          I spent 15 minutes with patient today in which [facility] than 50% of the time was spent in counseling/coordination of care regarding mgmt, testing  VIRTUAL VISIT DISCLAIMER      Terrell Abebe verbally agrees to participate in [employer]  Pt is aware that [employer] could be limited without vital signs or the ability to perform a full hands-on physical Hazle Thanh understands she or the provider may request at any time to terminate the video visit and request the patient to seek care or treatment in person

## 2022-06-22 NOTE — ASSESSMENT & PLAN NOTE
Lack of interest, lack of motivation likely secondary to depression versus continued cognitive decline verses bipolar disorder (as reported by son but no record in chart) versus combination of the aforementioned  · Patient is Chinese-speaking and minimally conversant  · Son is POA    Plan: Son who is POA previously resistant to use of antidepressants or other psychiatric medications but has today become agreeable to a trial of psychiatric medications  Considering patient has history of bipolar disorder she will likely need a combination of an antipsychotic and antidepressant the S I have asked our staff to schedule a visit with our psychiatry team in order to evaluate and initiate treatment accordingly  I discussed the need for psychiatry referral with patient's son and he is agreeable

## 2022-06-22 NOTE — ASSESSMENT & PLAN NOTE
Lab Results   Component Value Date    EGFR 97 03/13/2022    EGFR 34 12/24/2020    EGFR 19 12/15/2020    CREATININE 0 51 (L) 03/13/2022    CREATININE 1 54 (H) 12/24/2020    CREATININE 2 54 (H) 12/15/2020     Patient's POA (son) refuses referral to Nephrology and dialysis  He verbalizes understanding of possible complications of not moving forward with treatment including electrolyte derangement, acid-base disorders, and death  He is agreeable to blood work to monitor electrolytes  Plan:  Blood work as listed in problem list to assess previously abnormal electrolytes

## 2022-06-23 ENCOUNTER — TELEPHONE (OUTPATIENT)
Dept: PSYCHIATRY | Facility: CLINIC | Age: 72
End: 2022-06-23

## 2022-07-06 ENCOUNTER — TELEPHONE (OUTPATIENT)
Dept: FAMILY MEDICINE CLINIC | Facility: CLINIC | Age: 72
End: 2022-07-06

## 2022-07-06 NOTE — TELEPHONE ENCOUNTER
Certificate of medical necessity form in folder in preceptor room to be completed and faxed back  Thank you

## 2022-08-01 ENCOUNTER — TELEPHONE (OUTPATIENT)
Dept: FAMILY MEDICINE CLINIC | Facility: CLINIC | Age: 72
End: 2022-08-01

## 2022-08-01 NOTE — TELEPHONE ENCOUNTER
Patient's son called inquiring about the status of a wheelchair order that was placed in May by Dr Cam Guzman  Can you please look into this? Thank you!

## 2022-08-19 ENCOUNTER — TELEPHONE (OUTPATIENT)
Dept: FAMILY MEDICINE CLINIC | Facility: CLINIC | Age: 72
End: 2022-08-19

## 2022-08-24 NOTE — TELEPHONE ENCOUNTER
Received fax stating order in parachute requires signature from clinician, please review, thank you

## 2022-08-31 ENCOUNTER — TELEPHONE (OUTPATIENT)
Dept: FAMILY MEDICINE CLINIC | Facility: CLINIC | Age: 72
End: 2022-08-31

## 2022-08-31 NOTE — TELEPHONE ENCOUNTER
Wheelchair order to be signed and faxed back or completed digitally, please review thank you   Form placed in folder in preceptor room

## 2022-09-01 NOTE — TELEPHONE ENCOUNTER
Yes, you did complete the wheelchair order paperwork  The order was for a standard wheelchair and today her son called to say he thought it was supposed to be for an electric wheelchair  Not sure if you would like to review, but I did fax back completed paperwork yesterday  Thank you!

## 2022-09-01 NOTE — TELEPHONE ENCOUNTER
I'm happy to complete an electric wheelchair order, it does look like [employer] sent the standard wheelchair form  If they want to send over the electric order form I can complete that instead

## 2022-09-01 NOTE — TELEPHONE ENCOUNTER
Patients son called to say that [employer] was supposed to have an order placed for an electric power wheelchair  Please advise, thank you!

## 2022-09-02 ENCOUNTER — TELEPHONE (OUTPATIENT)
Dept: FAMILY MEDICINE CLINIC | Facility: CLINIC | Age: 72
End: 2022-09-02

## 2022-09-02 NOTE — TELEPHONE ENCOUNTER
Patient's insurance called to request a new order for an electric powered wheelchair be placed for patient   Please advise, thank you

## 2022-09-12 ENCOUNTER — VBI (OUTPATIENT)
Dept: ADMINISTRATIVE | Facility: OTHER | Age: 72
End: 2022-09-12

## 2022-09-15 ENCOUNTER — TELEPHONE (OUTPATIENT)
Dept: FAMILY MEDICINE CLINIC | Facility: CLINIC | Age: 72
End: 2022-09-15

## 2022-09-15 NOTE — TELEPHONE ENCOUNTER
This order needs to be signed by a medicaid enrolled provider - forms placed in folder in preceptor room, thank you

## 2022-09-21 DIAGNOSIS — Z99.3 WHEELCHAIR BOUND: ICD-10-CM

## 2022-09-21 DIAGNOSIS — N18.4 CKD (CHRONIC KIDNEY DISEASE) STAGE 4, GFR 15-29 ML/MIN (HCC): ICD-10-CM

## 2022-09-21 DIAGNOSIS — I25.119 CORONARY ARTERY DISEASE INVOLVING NATIVE CORONARY ARTERY OF NATIVE HEART WITH ANGINA PECTORIS (HCC): Primary | ICD-10-CM

## 2022-09-21 DIAGNOSIS — R53.82 CHRONIC FATIGUE: ICD-10-CM

## 2022-09-21 DIAGNOSIS — R64 CACHEXIA (HCC): ICD-10-CM

## 2022-09-21 NOTE — TELEPHONE ENCOUNTER
I have entered an order and Dr Dipak Braun will need to sign and you can fax it to [facility] as requested by insurance  I tried calling insurance but you can fax and Aggie Hansen will follow up  with us  I believe this patient will need to be set up for a wheelchair evaluation with [employer] [facility] as part of this process

## 2022-09-29 ENCOUNTER — TELEPHONE (OUTPATIENT)
Dept: FAMILY MEDICINE CLINIC | Facility: CLINIC | Age: 72
End: 2022-09-29

## 2022-09-29 NOTE — TELEPHONE ENCOUNTER
I have sent office notes as requested on 9/26 to try to authorize power wheelchair thru [employer] health info faxed to [phone]  I tried to call the son to set up an appointment since it appears the last note in March did not contain the specific guidelines needed for a power chair  After we hear back from [employer] regarding the progress on this patient will likely need a WC eval at a [facility] clinic

## 2022-10-31 ENCOUNTER — VBI (OUTPATIENT)
Dept: ADMINISTRATIVE | Facility: OTHER | Age: 72
End: 2022-10-31

## 2022-11-22 ENCOUNTER — TELEPHONE (OUTPATIENT)
Dept: PSYCHIATRY | Facility: CLINIC | Age: 72
End: 2022-11-22

## 2022-11-22 NOTE — TELEPHONE ENCOUNTER
Contacted patient off of Medication [facility] waitlist to verify needs of services in attempts to update list  lvm for patient to contact intake dept

## 2022-12-15 ENCOUNTER — VBI (OUTPATIENT)
Dept: ADMINISTRATIVE | Facility: OTHER | Age: 72
End: 2022-12-15

## 2023-03-09 ENCOUNTER — TELEPHONE (OUTPATIENT)
Dept: FAMILY MEDICINE CLINIC | Facility: CLINIC | Age: 73
End: 2023-03-09

## 2023-03-09 NOTE — TELEPHONE ENCOUNTER
Dept of human services form in provider pool folder in preceptor room to be completed and faxed back, thank you

## 2023-05-27 ENCOUNTER — RA CDI HCC (OUTPATIENT)
Dept: OTHER | Facility: HOSPITAL | Age: 73
End: 2023-05-27

## 2023-05-28 NOTE — PROGRESS NOTES
[facility] [address]  coding opportunities       Chart reviewed, no opportunity found: CHART REVIEWED, NO OPPORTUNITY FOUND        Patients Insurance     Medicare Insurance: [employer]

## 2023-10-02 ENCOUNTER — TELEPHONE (OUTPATIENT)
Dept: FAMILY MEDICINE CLINIC | Facility: CLINIC | Age: 73
End: 2023-10-02

## 2023-10-02 NOTE — TELEPHONE ENCOUNTER
Called: Tisha    Doctor: Yamileth Baltazar      Reason for call: Giselle Salmon call and is requesting for Dr. Yamileth Baltazar to giver her a call inference to this patient.       Number: Giselle Salmon [facility] [phone]

## 2023-10-05 NOTE — TELEPHONE ENCOUNTER
I am familiar with this patient as I met her when she saw Dr. Robert Gaona previously, and I discussed the patient with him several times thereafter; she was last seen in our office 2022. Spoke with Alvin Kirk at the 's office, patient apparently  in her sleep overnight Friday into Saturday. As patient has not been seen in a while it is difficult to determine cause of death, but given patient's history of stage IV CKD & malnutrition and 's office reporting that family stated patient was not eating or drinking, suspect likely acute on chronic kidney failure related to dehydration 2/2 poor PO intake related to cognitive impairment. Completed the certificate and gave to clinical staff for faxing.

## 2023-12-11 NOTE — ASSESSMENT & PLAN NOTE
2/2 dementia vs psychosis in setting of uncontrolled psychiatric conditions (bipolar 1 disorder and schizophrenia), may by ?schizoaffective disorder considering sxs  Plan: referral to geriatrics for mindstream testing, referral to psychiatry for assessment of psychiatric conditions educated pt that sxs that are consistent with dementia can overlap with psychosis and discussed importance of psych and geriatrics f/u  Caregiver verbalizes understanding and agrees with the plan  No

## 2024-03-11 NOTE — ASSESSMENT & PLAN NOTE
Spoke to patient will continue weekly testing to be able to keep her home monitor.    Was previously on prednisone (per son) and leflunamide (per chart review - care everywhere)  Both were stopped by son because of concerns that the decreased pain makes increases activity which leads to injuries  Severe deformity of hands and feet bilaterally  Plan: will discuss referral to rheumatology at f/u, did not do it today as want to avoid overwhelming caregiver in attempt to optimize adherence to plan  Also, need guidance by social work for transport, social work referral ordered  F/u in 2 weeks